# Patient Record
Sex: MALE | Race: ASIAN | NOT HISPANIC OR LATINO
[De-identification: names, ages, dates, MRNs, and addresses within clinical notes are randomized per-mention and may not be internally consistent; named-entity substitution may affect disease eponyms.]

---

## 2023-08-22 PROBLEM — Z87.09 HISTORY OF CHRONIC BRONCHITIS: Status: RESOLVED | Noted: 2023-08-22 | Resolved: 2023-08-22

## 2023-08-22 PROBLEM — Z00.00 ENCOUNTER FOR PREVENTIVE HEALTH EXAMINATION: Status: ACTIVE | Noted: 2023-08-22

## 2023-08-22 PROBLEM — R05.3 CHRONIC COUGH: Status: ACTIVE | Noted: 2023-08-22

## 2023-08-22 RX ORDER — ALBUTEROL SULFATE 2.5 MG/3ML
(2.5 MG/3ML) SOLUTION RESPIRATORY (INHALATION)
Refills: 0 | Status: ACTIVE | COMMUNITY

## 2023-08-22 RX ORDER — FLUTICASONE FUROATE, UMECLIDINIUM BROMIDE AND VILANTEROL TRIFENATATE 100; 62.5; 25 UG/1; UG/1; UG/1
100-62.5-25 POWDER RESPIRATORY (INHALATION)
Refills: 0 | Status: ACTIVE | COMMUNITY

## 2023-08-22 RX ORDER — PANTOPRAZOLE SODIUM 40 MG/10ML
40 INJECTION, POWDER, FOR SOLUTION INTRAVENOUS
Refills: 0 | Status: ACTIVE | COMMUNITY

## 2023-08-22 RX ORDER — PREDNISONE 20 MG/1
20 TABLET ORAL
Refills: 0 | Status: ACTIVE | COMMUNITY

## 2023-08-25 ENCOUNTER — APPOINTMENT (OUTPATIENT)
Dept: THORACIC SURGERY | Facility: CLINIC | Age: 53
End: 2023-08-25
Payer: COMMERCIAL

## 2023-08-25 ENCOUNTER — OUTPATIENT (OUTPATIENT)
Dept: OUTPATIENT SERVICES | Facility: HOSPITAL | Age: 53
LOS: 1 days | End: 2023-08-25
Payer: COMMERCIAL

## 2023-08-25 DIAGNOSIS — Z87.891 PERSONAL HISTORY OF NICOTINE DEPENDENCE: ICD-10-CM

## 2023-08-25 DIAGNOSIS — R05.3 CHRONIC COUGH: ICD-10-CM

## 2023-08-25 DIAGNOSIS — Z01.818 ENCOUNTER FOR OTHER PREPROCEDURAL EXAMINATION: ICD-10-CM

## 2023-08-25 DIAGNOSIS — J84.9 INTERSTITIAL PULMONARY DISEASE, UNSPECIFIED: ICD-10-CM

## 2023-08-25 DIAGNOSIS — Z87.09 PERSONAL HISTORY OF OTHER DISEASES OF THE RESPIRATORY SYSTEM: ICD-10-CM

## 2023-08-25 LAB
ALBUMIN SERPL ELPH-MCNC: 4.6 G/DL — SIGNIFICANT CHANGE UP (ref 3.3–5)
ALP SERPL-CCNC: 90 U/L — SIGNIFICANT CHANGE UP (ref 40–120)
ALT FLD-CCNC: 23 U/L — SIGNIFICANT CHANGE UP (ref 10–45)
ANION GAP SERPL CALC-SCNC: 10 MMOL/L — SIGNIFICANT CHANGE UP (ref 5–17)
APPEARANCE UR: CLEAR — SIGNIFICANT CHANGE UP
APTT BLD: 30 SEC — SIGNIFICANT CHANGE UP (ref 24.5–35.6)
AST SERPL-CCNC: 19 U/L — SIGNIFICANT CHANGE UP (ref 10–40)
BASOPHILS # BLD AUTO: 0.04 K/UL — SIGNIFICANT CHANGE UP (ref 0–0.2)
BASOPHILS NFR BLD AUTO: 0.4 % — SIGNIFICANT CHANGE UP (ref 0–2)
BILIRUB SERPL-MCNC: 1.1 MG/DL — SIGNIFICANT CHANGE UP (ref 0.2–1.2)
BILIRUB UR-MCNC: NEGATIVE — SIGNIFICANT CHANGE UP
BLD GP AB SCN SERPL QL: NEGATIVE — SIGNIFICANT CHANGE UP
BLD GP AB SCN SERPL QL: NEGATIVE — SIGNIFICANT CHANGE UP
BUN SERPL-MCNC: 20 MG/DL — SIGNIFICANT CHANGE UP (ref 7–23)
CALCIUM SERPL-MCNC: 9.6 MG/DL — SIGNIFICANT CHANGE UP (ref 8.4–10.5)
CHLORIDE SERPL-SCNC: 102 MMOL/L — SIGNIFICANT CHANGE UP (ref 96–108)
CO2 SERPL-SCNC: 30 MMOL/L — SIGNIFICANT CHANGE UP (ref 22–31)
COLOR SPEC: YELLOW — SIGNIFICANT CHANGE UP
CREAT SERPL-MCNC: 0.89 MG/DL — SIGNIFICANT CHANGE UP (ref 0.5–1.3)
DIFF PNL FLD: NEGATIVE — SIGNIFICANT CHANGE UP
EGFR: 103 ML/MIN/1.73M2 — SIGNIFICANT CHANGE UP
EOSINOPHIL # BLD AUTO: 0.07 K/UL — SIGNIFICANT CHANGE UP (ref 0–0.5)
EOSINOPHIL NFR BLD AUTO: 0.8 % — SIGNIFICANT CHANGE UP (ref 0–6)
GLUCOSE SERPL-MCNC: 75 MG/DL — SIGNIFICANT CHANGE UP (ref 70–99)
GLUCOSE UR QL: NEGATIVE — SIGNIFICANT CHANGE UP
HCT VFR BLD CALC: 50.9 % — HIGH (ref 39–50)
HGB BLD-MCNC: 16.7 G/DL — SIGNIFICANT CHANGE UP (ref 13–17)
IMM GRANULOCYTES NFR BLD AUTO: 1.2 % — HIGH (ref 0–0.9)
INR BLD: 0.82 — LOW (ref 0.85–1.18)
KETONES UR-MCNC: NEGATIVE — SIGNIFICANT CHANGE UP
LEUKOCYTE ESTERASE UR-ACNC: NEGATIVE — SIGNIFICANT CHANGE UP
LYMPHOCYTES # BLD AUTO: 2.51 K/UL — SIGNIFICANT CHANGE UP (ref 1–3.3)
LYMPHOCYTES # BLD AUTO: 27.9 % — SIGNIFICANT CHANGE UP (ref 13–44)
MCHC RBC-ENTMCNC: 31.3 PG — SIGNIFICANT CHANGE UP (ref 27–34)
MCHC RBC-ENTMCNC: 32.8 GM/DL — SIGNIFICANT CHANGE UP (ref 32–36)
MCV RBC AUTO: 95.5 FL — SIGNIFICANT CHANGE UP (ref 80–100)
MONOCYTES # BLD AUTO: 0.61 K/UL — SIGNIFICANT CHANGE UP (ref 0–0.9)
MONOCYTES NFR BLD AUTO: 6.8 % — SIGNIFICANT CHANGE UP (ref 2–14)
NEUTROPHILS # BLD AUTO: 5.66 K/UL — SIGNIFICANT CHANGE UP (ref 1.8–7.4)
NEUTROPHILS NFR BLD AUTO: 62.9 % — SIGNIFICANT CHANGE UP (ref 43–77)
NITRITE UR-MCNC: NEGATIVE — SIGNIFICANT CHANGE UP
NRBC # BLD: 0 /100 WBCS — SIGNIFICANT CHANGE UP (ref 0–0)
PH UR: 6 — SIGNIFICANT CHANGE UP (ref 5–8)
PLATELET # BLD AUTO: 200 K/UL — SIGNIFICANT CHANGE UP (ref 150–400)
POTASSIUM SERPL-MCNC: 4.5 MMOL/L — SIGNIFICANT CHANGE UP (ref 3.5–5.3)
POTASSIUM SERPL-SCNC: 4.5 MMOL/L — SIGNIFICANT CHANGE UP (ref 3.5–5.3)
PROT SERPL-MCNC: 7.9 G/DL — SIGNIFICANT CHANGE UP (ref 6–8.3)
PROT UR-MCNC: NEGATIVE MG/DL — SIGNIFICANT CHANGE UP
PROTHROM AB SERPL-ACNC: 9.4 SEC — LOW (ref 9.5–13)
RBC # BLD: 5.33 M/UL — SIGNIFICANT CHANGE UP (ref 4.2–5.8)
RBC # FLD: 13 % — SIGNIFICANT CHANGE UP (ref 10.3–14.5)
RH IG SCN BLD-IMP: POSITIVE — SIGNIFICANT CHANGE UP
RH IG SCN BLD-IMP: POSITIVE — SIGNIFICANT CHANGE UP
SODIUM SERPL-SCNC: 142 MMOL/L — SIGNIFICANT CHANGE UP (ref 135–145)
SP GR SPEC: 1.02 — SIGNIFICANT CHANGE UP (ref 1–1.03)
UROBILINOGEN FLD QL: 0.2 E.U./DL — SIGNIFICANT CHANGE UP
WBC # BLD: 9 K/UL — SIGNIFICANT CHANGE UP (ref 3.8–10.5)
WBC # FLD AUTO: 9 K/UL — SIGNIFICANT CHANGE UP (ref 3.8–10.5)

## 2023-08-25 PROCEDURE — 93010 ELECTROCARDIOGRAM REPORT: CPT

## 2023-08-25 PROCEDURE — 86900 BLOOD TYPING SEROLOGIC ABO: CPT

## 2023-08-25 PROCEDURE — 86850 RBC ANTIBODY SCREEN: CPT

## 2023-08-25 PROCEDURE — 81003 URINALYSIS AUTO W/O SCOPE: CPT

## 2023-08-25 PROCEDURE — 80053 COMPREHEN METABOLIC PANEL: CPT

## 2023-08-25 PROCEDURE — 86901 BLOOD TYPING SEROLOGIC RH(D): CPT

## 2023-08-25 PROCEDURE — 85730 THROMBOPLASTIN TIME PARTIAL: CPT

## 2023-08-25 PROCEDURE — 85610 PROTHROMBIN TIME: CPT

## 2023-08-25 PROCEDURE — 99202 OFFICE O/P NEW SF 15 MIN: CPT

## 2023-08-25 PROCEDURE — 93005 ELECTROCARDIOGRAM TRACING: CPT

## 2023-08-25 PROCEDURE — 85025 COMPLETE CBC W/AUTO DIFF WBC: CPT

## 2023-08-28 PROBLEM — J84.9 ILD (INTERSTITIAL LUNG DISEASE): Status: ACTIVE | Noted: 2023-08-22

## 2023-08-28 PROBLEM — Z87.891 FORMER SMOKER: Status: ACTIVE | Noted: 2023-08-28

## 2023-08-28 NOTE — ASSESSMENT
[FreeTextEntry1] : 52 year old male, former light smoker (smoked 2-3 cigarette/week), no significant medical history,  Patient is referred by Dr. Teo Álvarez for an initial evaluation of ILD to discuss possible lung biopsy. Sputum AFB smear is negativex3, culture negative x 4 weeks.   Patient worked in [employer] for over 10 years, Admits to persistent dry cough and SOB on exertion. There is no chest pain, fever, chills, intentional weight loss and hemoptysis.  I reviewed the recent CT chest with the patient and his family today. There is evidence of interstitial fibrotic changes predominating at the left lung with undetermined etiology. I suggested Left VATs, RA, wedge resection of left upper lobe and left lower lobe for the purpose of diagnosis. The risks, benefits and alternatives of proceeding with lung biopsy, specifically, the risk of bleeding, need for a blood transfusion, conversion to an open procedure, prolonged air leak, dysrhythmia, myocardial infarction, deep venous thrombosis, pulmonary embolus, postoperative pain syndrome, infection was discussed with the patient, who understands and agrees to the above.  He will require medical clearance, PST labs, UA, and EKG prior to surgery. Will schedule surgery for 09/15/23. Will   Plan: 1.PST labs, UA (Done today) 3. Medical clearance  4. Left VATS, robotic assisted, Wedge resection of left upper lobe and left lower lobe on 09/15/23.   Celeste FINNEGAN RN, am scribing for and the presence of Dr. LUIS MICHAUD the following sections: history of present illness, past medical/family/surgical/family/social history, review of systems, vital signs, physical exam, and disposition.  LUIS FINNEGAN, personally performed the services described in the documentation, reviewed the documentation recorded by the scribe in my presence and it accurately and completely records my words and actions.

## 2023-08-28 NOTE — CONSULT LETTER
[Dear  ___] : Dear  [unfilled], [Consult Letter:] : I had the pleasure of evaluating your patient, [unfilled]. [Please see my note below.] : Please see my note below. [Consult Closing:] : Thank you very much for allowing me to participate in the care of this patient.  If you have any questions, please do not hesitate to contact me. [Sincerely,] : Sincerely, [FreeTextEntry3] : Christ Good MD Professor, Cardiovascular & Thoracic Surgery [employer] Director of the [facility]  Director of Thoracic Surgery, [address] [place] [address] Phone: [phone] Fax: [phone]

## 2023-08-28 NOTE — PHYSICAL EXAM
[General Appearance - Alert] : alert [General Appearance - In No Acute Distress] : in no acute distress [Examination Of The Chest] : the chest was normal in appearance [Chest Visual Inspection Thoracic Asymmetry] : no chest asymmetry [Diminished Respiratory Excursion] : normal chest expansion [Abnormal Walk] : normal gait [Nail Clubbing] : no clubbing  or cyanosis of the fingernails [Musculoskeletal - Swelling] : no joint swelling seen [Motor Tone] : muscle strength and tone were normal [Oriented To Time, Place, And Person] : oriented to person, place, and time [Impaired Insight] : insight and judgment were intact [Affect] : the affect was normal

## 2023-08-28 NOTE — HISTORY OF PRESENT ILLNESS
[FreeTextEntry1] : 52 year old male, former light smoker (smoked 2-3 cigarette/week), no significant medical history, who worked in [employer] for over 10 years, with c/o persistent dry cough and SOB on exertion. Patient is referred by Dr. Teo Álvarez for an initial evaluation of ILD to discuss possible lung biopsy. Sputum AFB smear is negativex3, culture negative x 4 weeks.   CT chest completed on 06/03/2023: Atherosclerosis with minimal coronary artery calcification. There is extensive interstitial pulmonary fibrosis and bronchiectasis on the left where there is volume loss and shift of the heart, mediastinum and trachea to the left with elevation of the left hemidiaphragm. There is less severe interstitial pulmonary disease on the right primarily peripherally in the right upper and right lower lobes. There is no evidence for congestive heart failure or pneumonia. There is no pleural effusion or pneumothorax. Thickening of the wall of the stomach and mass cannot be excluded. Endoscopy and or upper GI series is suggested.

## 2023-09-13 VITALS
HEART RATE: 80 BPM | DIASTOLIC BLOOD PRESSURE: 64 MMHG | RESPIRATION RATE: 18 BRPM | TEMPERATURE: 98 F | OXYGEN SATURATION: 94 % | WEIGHT: 126.1 LBS | SYSTOLIC BLOOD PRESSURE: 118 MMHG

## 2023-09-13 NOTE — H&P ADULT - HISTORY OF PRESENT ILLNESS
52 year old Mandarin speaking male, former light smoker (smoked 2-3 cigarette/week), no significant medical history, who worked in [employer] for over 10 years presents with c/o persistent dry cough and SOB on exertion. Patient is referred by Dr. Teo Álvarez. Sputum AFB smear is negativex3, culture negative x 4 weeks. Patient seen by Dr. Christ Good in the outpatient office for surgical consult, completed preop work up and clearance. He now presents for elective surgery.     CT chest completed on 06/03/2023:  Atherosclerosis with minimal coronary artery calcification. There is extensive interstitial pulmonary fibrosis and bronchiectasis on the left where there is volume loss and shift of the heart, mediastinum and trachea to the left with elevation of the left hemidiaphragm. There is less severe interstitial pulmonary disease on the right primarily peripherally in the right upper and right lower lobes. There is no evidence for congestive heart failure or pneumonia. There is no pleural effusion or pneumothorax. Thickening of the wall of the stomach and mass cannot be excluded. Endoscopy and or upper GI series is suggested.

## 2023-09-13 NOTE — H&P ADULT - ASSESSMENT
52 year old male, former light smoker, no significant medical history presents with persistent dry cough and SOB on exertion with ILD of the left lung.  Sputum AFB smear is negativex3, culture negative x 4 weeks.There is no chest pain, fever, chills, intentional weight loss and hemoptysis.  ?  Dr. Good reviewed the recent CT chest with the patient and his family. There is evidence of interstitial fibrotic changes predominating at the left lung with undetermined etiology. Dr. Good  suggested Left VATs, RA, wedge resection of left upper lobe and left lower lobe for the purpose of diagnosis. The risks, benefits and alternatives of proceeding with lung biopsy, specifically, the risk of bleeding, need for a blood transfusion, conversion to an open procedure, prolonged air leak, dysrhythmia, myocardial infarction, deep venous thrombosis, pulmonary embolus, postoperative pain syndrome, infection was discussed with the patient, who understands and agrees to the above.  ?  He will require medical clearance, PST labs, UA, and EKG prior to surgery. Will schedule surgery for 09/15/23. Will  ?  Plan:  1.PST labs  3. Medical clearance  4. Left VATS, robotic assisted, Wedge resection of left upper lobe and left lower lobe on 09/15/23.  ?

## 2023-09-14 ENCOUNTER — TRANSCRIPTION ENCOUNTER (OUTPATIENT)
Age: 53
End: 2023-09-14

## 2023-09-14 RX ORDER — INFLUENZA VIRUS VACCINE 15; 15; 15; 15 UG/.5ML; UG/.5ML; UG/.5ML; UG/.5ML
0.5 SUSPENSION INTRAMUSCULAR ONCE
Refills: 0 | Status: DISCONTINUED | OUTPATIENT
Start: 2023-09-15 | End: 2023-09-16

## 2023-09-14 NOTE — PATIENT PROFILE ADULT - FALL HARM RISK - UNIVERSAL INTERVENTIONS
Bed in lowest position, wheels locked, appropriate side rails in place/Call bell, personal items and telephone in reach/Instruct patient to call for assistance before getting out of bed or chair/Non-slip footwear when patient is out of bed/[place] to call system/Physically safe environment - no spills, clutter or unnecessary equipment/Purposeful Proactive Rounding/Room/bathroom lighting operational, light cord in reach

## 2023-09-15 ENCOUNTER — INPATIENT (INPATIENT)
Facility: HOSPITAL | Age: 53
LOS: 0 days | Discharge: HOME HEALTH | DRG: 165 | End: 2023-09-16
Attending: THORACIC SURGERY (CARDIOTHORACIC VASCULAR SURGERY) | Admitting: THORACIC SURGERY (CARDIOTHORACIC VASCULAR SURGERY)
Payer: COMMERCIAL

## 2023-09-15 ENCOUNTER — APPOINTMENT (OUTPATIENT)
Dept: THORACIC SURGERY | Facility: HOSPITAL | Age: 53
End: 2023-09-15

## 2023-09-15 ENCOUNTER — RESULT REVIEW (OUTPATIENT)
Age: 53
End: 2023-09-15

## 2023-09-15 LAB
ANION GAP SERPL CALC-SCNC: 7 MMOL/L — SIGNIFICANT CHANGE UP (ref 5–17)
APTT BLD: 36 SEC — HIGH (ref 24.5–35.6)
BASOPHILS # BLD AUTO: 0.03 K/UL — SIGNIFICANT CHANGE UP (ref 0–0.2)
BASOPHILS NFR BLD AUTO: 0.3 % — SIGNIFICANT CHANGE UP (ref 0–2)
BLD GP AB SCN SERPL QL: NEGATIVE — SIGNIFICANT CHANGE UP
BUN SERPL-MCNC: 17 MG/DL — SIGNIFICANT CHANGE UP (ref 7–23)
CALCIUM SERPL-MCNC: 9.3 MG/DL — SIGNIFICANT CHANGE UP (ref 8.4–10.5)
CHLORIDE SERPL-SCNC: 104 MMOL/L — SIGNIFICANT CHANGE UP (ref 96–108)
CO2 SERPL-SCNC: 27 MMOL/L — SIGNIFICANT CHANGE UP (ref 22–31)
CREAT SERPL-MCNC: 0.9 MG/DL — SIGNIFICANT CHANGE UP (ref 0.5–1.3)
EGFR: 103 ML/MIN/1.73M2 — SIGNIFICANT CHANGE UP
EOSINOPHIL # BLD AUTO: 0.02 K/UL — SIGNIFICANT CHANGE UP (ref 0–0.5)
EOSINOPHIL NFR BLD AUTO: 0.2 % — SIGNIFICANT CHANGE UP (ref 0–6)
GLUCOSE SERPL-MCNC: 115 MG/DL — HIGH (ref 70–99)
GRAM STN FLD: SIGNIFICANT CHANGE UP
GRAM STN FLD: SIGNIFICANT CHANGE UP
HCT VFR BLD CALC: 46 % — SIGNIFICANT CHANGE UP (ref 39–50)
HGB BLD-MCNC: 15.5 G/DL — SIGNIFICANT CHANGE UP (ref 13–17)
IMM GRANULOCYTES NFR BLD AUTO: 0.9 % — SIGNIFICANT CHANGE UP (ref 0–0.9)
INR BLD: 0.88 — SIGNIFICANT CHANGE UP (ref 0.85–1.18)
LYMPHOCYTES # BLD AUTO: 0.97 K/UL — LOW (ref 1–3.3)
LYMPHOCYTES # BLD AUTO: 9.1 % — LOW (ref 13–44)
MCHC RBC-ENTMCNC: 32 PG — SIGNIFICANT CHANGE UP (ref 27–34)
MCHC RBC-ENTMCNC: 33.7 GM/DL — SIGNIFICANT CHANGE UP (ref 32–36)
MCV RBC AUTO: 94.8 FL — SIGNIFICANT CHANGE UP (ref 80–100)
MONOCYTES # BLD AUTO: 0.19 K/UL — SIGNIFICANT CHANGE UP (ref 0–0.9)
MONOCYTES NFR BLD AUTO: 1.8 % — LOW (ref 2–14)
NEUTROPHILS # BLD AUTO: 9.38 K/UL — HIGH (ref 1.8–7.4)
NEUTROPHILS NFR BLD AUTO: 87.7 % — HIGH (ref 43–77)
NRBC # BLD: 0 /100 WBCS — SIGNIFICANT CHANGE UP (ref 0–0)
PLATELET # BLD AUTO: 171 K/UL — SIGNIFICANT CHANGE UP (ref 150–400)
POTASSIUM SERPL-MCNC: 4.4 MMOL/L — SIGNIFICANT CHANGE UP (ref 3.5–5.3)
POTASSIUM SERPL-SCNC: 4.4 MMOL/L — SIGNIFICANT CHANGE UP (ref 3.5–5.3)
PROTHROM AB SERPL-ACNC: 10.1 SEC — SIGNIFICANT CHANGE UP (ref 9.5–13)
RBC # BLD: 4.85 M/UL — SIGNIFICANT CHANGE UP (ref 4.2–5.8)
RBC # FLD: 12.8 % — SIGNIFICANT CHANGE UP (ref 10.3–14.5)
RH IG SCN BLD-IMP: POSITIVE — SIGNIFICANT CHANGE UP
SODIUM SERPL-SCNC: 138 MMOL/L — SIGNIFICANT CHANGE UP (ref 135–145)
SPECIMEN SOURCE: SIGNIFICANT CHANGE UP
SPECIMEN SOURCE: SIGNIFICANT CHANGE UP
WBC # BLD: 10.69 K/UL — HIGH (ref 3.8–10.5)
WBC # FLD AUTO: 10.69 K/UL — HIGH (ref 3.8–10.5)

## 2023-09-15 PROCEDURE — 32666 THORACOSCOPY W/WEDGE RESECT: CPT | Mod: LT

## 2023-09-15 PROCEDURE — 71045 X-RAY EXAM CHEST 1 VIEW: CPT | Mod: 26

## 2023-09-15 PROCEDURE — 99222 1ST HOSP IP/OBS MODERATE 55: CPT

## 2023-09-15 PROCEDURE — 32667 THORACOSCOPY W/W RESECT ADDL: CPT | Mod: LT

## 2023-09-15 PROCEDURE — 88307 TISSUE EXAM BY PATHOLOGIST: CPT | Mod: 26

## 2023-09-15 PROCEDURE — 32667 THORACOSCOPY W/W RESECT ADDL: CPT | Mod: AS,LT

## 2023-09-15 PROCEDURE — S2900 ROBOTIC SURGICAL SYSTEM: CPT | Mod: NC

## 2023-09-15 PROCEDURE — 32666 THORACOSCOPY W/WEDGE RESECT: CPT | Mod: AS,LT

## 2023-09-15 DEVICE — CHEST DRAIN THORACIC PVC 28FR STRAIGHT: Type: IMPLANTABLE DEVICE | Site: LEFT | Status: FUNCTIONAL

## 2023-09-15 DEVICE — SURGICEL 4 X 8": Type: IMPLANTABLE DEVICE | Site: LEFT | Status: FUNCTIONAL

## 2023-09-15 DEVICE — STAPLER COVIDIEN TRI-STAPLE 45MM PURPLE INTELLIGENT RELOAD: Type: IMPLANTABLE DEVICE | Site: LEFT | Status: FUNCTIONAL

## 2023-09-15 DEVICE — STAPLER COVIDIEN TRI-STAPLE 60MM PURPLE INTELLIGENT RELOAD: Type: IMPLANTABLE DEVICE | Site: LEFT | Status: FUNCTIONAL

## 2023-09-15 RX ORDER — ACETAMINOPHEN 500 MG
975 TABLET ORAL ONCE
Refills: 0 | Status: COMPLETED | OUTPATIENT
Start: 2023-09-15 | End: 2023-09-15

## 2023-09-15 RX ORDER — METOCLOPRAMIDE HCL 10 MG
10 TABLET ORAL ONCE
Refills: 0 | Status: COMPLETED | OUTPATIENT
Start: 2023-09-15 | End: 2023-09-15

## 2023-09-15 RX ORDER — HEPARIN SODIUM 5000 [USP'U]/ML
5000 INJECTION INTRAVENOUS; SUBCUTANEOUS ONCE
Refills: 0 | Status: COMPLETED | OUTPATIENT
Start: 2023-09-15 | End: 2023-09-15

## 2023-09-15 RX ORDER — SODIUM CHLORIDE 9 MG/ML
1000 INJECTION, SOLUTION INTRAVENOUS
Refills: 0 | Status: DISCONTINUED | OUTPATIENT
Start: 2023-09-15 | End: 2023-09-16

## 2023-09-15 RX ORDER — POLYETHYLENE GLYCOL 3350 17 G/17G
17 POWDER, FOR SOLUTION ORAL DAILY
Refills: 0 | Status: DISCONTINUED | OUTPATIENT
Start: 2023-09-15 | End: 2023-09-16

## 2023-09-15 RX ORDER — ACETAMINOPHEN 500 MG
1000 TABLET ORAL ONCE
Refills: 0 | Status: COMPLETED | OUTPATIENT
Start: 2023-09-15 | End: 2023-09-15

## 2023-09-15 RX ORDER — FAMOTIDINE 10 MG/ML
20 INJECTION INTRAVENOUS DAILY
Refills: 0 | Status: DISCONTINUED | OUTPATIENT
Start: 2023-09-15 | End: 2023-09-15

## 2023-09-15 RX ORDER — IPRATROPIUM/ALBUTEROL SULFATE 18-103MCG
3 AEROSOL WITH ADAPTER (GRAM) INHALATION EVERY 6 HOURS
Refills: 0 | Status: DISCONTINUED | OUTPATIENT
Start: 2023-09-15 | End: 2023-09-16

## 2023-09-15 RX ORDER — CEFAZOLIN SODIUM 1 G
2000 VIAL (EA) INJECTION EVERY 8 HOURS
Refills: 0 | Status: COMPLETED | OUTPATIENT
Start: 2023-09-15 | End: 2023-09-16

## 2023-09-15 RX ORDER — LIDOCAINE 4 G/100G
1 CREAM TOPICAL DAILY
Refills: 0 | Status: DISCONTINUED | OUTPATIENT
Start: 2023-09-15 | End: 2023-09-16

## 2023-09-15 RX ORDER — KETOROLAC TROMETHAMINE 30 MG/ML
15 SYRINGE (ML) INJECTION EVERY 4 HOURS
Refills: 0 | Status: DISCONTINUED | OUTPATIENT
Start: 2023-09-15 | End: 2023-09-16

## 2023-09-15 RX ORDER — ACETAMINOPHEN 500 MG
650 TABLET ORAL EVERY 6 HOURS
Refills: 0 | Status: DISCONTINUED | OUTPATIENT
Start: 2023-09-16 | End: 2023-09-16

## 2023-09-15 RX ORDER — PANTOPRAZOLE SODIUM 20 MG/1
40 TABLET, DELAYED RELEASE ORAL
Refills: 0 | Status: DISCONTINUED | OUTPATIENT
Start: 2023-09-15 | End: 2023-09-16

## 2023-09-15 RX ORDER — GABAPENTIN 400 MG/1
300 CAPSULE ORAL ONCE
Refills: 0 | Status: COMPLETED | OUTPATIENT
Start: 2023-09-15 | End: 2023-09-15

## 2023-09-15 RX ORDER — HEPARIN SODIUM 5000 [USP'U]/ML
5000 INJECTION INTRAVENOUS; SUBCUTANEOUS EVERY 8 HOURS
Refills: 0 | Status: DISCONTINUED | OUTPATIENT
Start: 2023-09-15 | End: 2023-09-16

## 2023-09-15 RX ADMIN — Medication 20 MILLIGRAM(S): at 17:48

## 2023-09-15 RX ADMIN — Medication 100 MILLIGRAM(S): at 16:07

## 2023-09-15 RX ADMIN — HEPARIN SODIUM 5000 UNIT(S): 5000 INJECTION INTRAVENOUS; SUBCUTANEOUS at 22:33

## 2023-09-15 RX ADMIN — Medication 10 MILLIGRAM(S): at 11:23

## 2023-09-15 RX ADMIN — Medication 975 MILLIGRAM(S): at 07:09

## 2023-09-15 RX ADMIN — LIDOCAINE 1 PATCH: 4 CREAM TOPICAL at 11:03

## 2023-09-15 RX ADMIN — HEPARIN SODIUM 5000 UNIT(S): 5000 INJECTION INTRAVENOUS; SUBCUTANEOUS at 07:08

## 2023-09-15 RX ADMIN — SODIUM CHLORIDE 60 MILLILITER(S): 9 INJECTION, SOLUTION INTRAVENOUS at 11:23

## 2023-09-15 RX ADMIN — LIDOCAINE 1 PATCH: 4 CREAM TOPICAL at 22:33

## 2023-09-15 RX ADMIN — Medication 1000 MILLIGRAM(S): at 16:19

## 2023-09-15 RX ADMIN — LIDOCAINE 1 PATCH: 4 CREAM TOPICAL at 21:16

## 2023-09-15 RX ADMIN — HEPARIN SODIUM 5000 UNIT(S): 5000 INJECTION INTRAVENOUS; SUBCUTANEOUS at 13:40

## 2023-09-15 RX ADMIN — GABAPENTIN 300 MILLIGRAM(S): 400 CAPSULE ORAL at 07:09

## 2023-09-15 RX ADMIN — PANTOPRAZOLE SODIUM 40 MILLIGRAM(S): 20 TABLET, DELAYED RELEASE ORAL at 17:48

## 2023-09-15 RX ADMIN — Medication 400 MILLIGRAM(S): at 14:50

## 2023-09-15 NOTE — CONSULT NOTE ADULT - ASSESSMENT
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 Patient is referred by Pulm Dr. Teo Álvarez. CT chest (06/03/2023) revealed extensive interstitial pulmonary fibrosis and bronchiectasis on the left where there is volume loss and shift of the heart, mediastinum and trachea to the left with elevation of the left hemidiaphragm. There is less severe interstitial pulmonary disease on the right primarily peripherally in the right upper and right lower lobes. There is no evidence for congestive heart failure or pneumonia. There is no pleural effusion or pneumothorax. Thickening of the wall of the stomach and mass cannot be excluded. Pt underwent   L VATS, YARITZA & LLL wedge resection ( 9/15/23)  POD#0     - 9LA for HD monitoring   - active care per CTS  - L CT x1 in place   - f/u CXR in am   - postop iv abx: ceFAZolin   IVPB 2000 milliGRAM(s) IV Intermittent every 8 hours  -albuterol/ipratropium for Nebulization 3 milliLiter(s) Nebulizer every 6 hours PRN  - c/w home dose predniSONE   Tablet 20 milliGRAM(s) Oral daily  - pain control:   lidocaine   4% Patch 1 Patch Transdermal daily  ketorolac   Injectable 15 milliGRAM(s) IV Push every 4 hours PRN  - bowel regimen:   polyethylene glycol 3350 17 Gram(s) Oral daily  - GI ppx: pantoprazole    Tablet 40 milliGRAM(s) Oral before breakfast  - DVT ppx: heparin   Injectable 5000 Unit(s) SubCutaneous every 8 hours  - IC use at bedside  - OOBTC w. assistance as tolerated      spoke w. pt and daughter at bedside.      Services continues to follow up with you, thank you for the consult.

## 2023-09-15 NOTE — BRIEF OPERATIVE NOTE - NSICDXBRIEFPROCEDURE_GEN_ALL_CORE_FT
PROCEDURES:  Robot-assisted thoracoscopic wedge resection of lung 15-Sep-2023 10:13:23 left upper lobe and left lower lobe wedge resection Alee Mclean

## 2023-09-15 NOTE — CONSULT NOTE ADULT - SUBJECTIVE AND OBJECTIVE BOX
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 Patient is referred by Pulm Dr. Teo Álvarez. Sputum AFB smear is negativex3, culture negative x 4 weeks. Patient seen by Dr. Christ Good in the outpatient office for surgical consult, completed preop work up and clearance. He now presents for elective surgery.     CT chest completed on 06/03/2023:  Atherosclerosis with minimal coronary artery calcification. There is extensive interstitial pulmonary fibrosis and bronchiectasis on the left where there is volume loss and shift of the heart, mediastinum and trachea to the left with elevation of the left hemidiaphragm. There is less severe interstitial pulmonary disease on the right primarily peripherally in the right upper and right lower lobes. There is no evidence for congestive heart failure or pneumonia. There is no pleural effusion or pneumothorax. Thickening of the wall of the stomach and mass cannot be excluded. Endoscopy and or upper GI series is suggested.    Pt underwent L VATS, YARITZA & LLL wedge resection ( 9/15/23)  POD#0      (13 Sep 2023 16:59)      PAST MEDICAL & SURGICAL HISTORY:  Chronic GERD      Chronic bronchitis, obstructive      Asthma      No significant past surgical history        Home Medications:  albuterol 2.5 mg/3 mL (0.083%) inhalation solution: 3 by nebulizer (15 Sep 2023 06:58)  omeprazole 40 mg oral delayed release capsule: 1 orally once a day (15 Sep 2023 06:58)  predniSONE 20 mg oral tablet: 1 orally once a day (15 Sep 2023 06:58)  Trelegy Ellipta 200 mcg-62.5 mcg-25 mcg/inh inhalation powder: 1 inhaled once a day (15 Sep 2023 06:58)    Allergies    No Known Allergies    Intolerances      FAMILY HISTORY:  No pertinent family history in first degree relatives      Social History:  former smoker (13 Sep 2023 16:59)      REVIEW OF SYSTEMS:  CONSTITUTIONAL: No fever, weight loss  EYES: No eye pain, or discharge  ENMT:  No tinnitus, vertigo  NECK: No pain or stiffness  RESPIRATORY: No cough, No dyspnea  CARDIOVASCULAR: No chest pain, or leg swelling  GASTROINTESTINAL: No abdominal pain. No diarrhea ;No melena or hematochezia.  GENITOURINARY: No dysuria, frequency, or hematuria  NEUROLOGICAL: No numbness, or tremors  SKIN: No itching, burning, rashes, or lesions   ENDOCRINE: No heat or cold intolerance;  MUSCULOSKELETAL: No joint pain or swelling;   PSYCHIATRIC: No mood swings, or difficulty sleeping  HEME/LYMPH: No easy bruising, or bleeding gums  ALLERGY AND IMMUNOLOGIC: No hives or eczema    Diet, Regular (09-15-23 @ 16:50) [Active]      CURRENT MEDICATIONS:   albuterol/ipratropium for Nebulization 3 milliLiter(s) Nebulizer every 6 hours PRN  ceFAZolin   IVPB 2000 milliGRAM(s) IV Intermittent every 8 hours  heparin   Injectable 5000 Unit(s) SubCutaneous every 8 hours  influenza   Vaccine 0.5 milliLiter(s) IntraMuscular once  ketorolac   Injectable 15 milliGRAM(s) IV Push every 4 hours PRN  lactated ringers. 1000 milliLiter(s) IV Continuous <Continuous>  lidocaine   4% Patch 1 Patch Transdermal daily  pantoprazole    Tablet 40 milliGRAM(s) Oral before breakfast  polyethylene glycol 3350 17 Gram(s) Oral daily  predniSONE   Tablet 20 milliGRAM(s) Oral daily      VITAL SIGNS, INS/OUTS (last 24 hours):  Vital Signs Last 24 Hrs  T(C): 37.2 (15 Sep 2023 17:08), Max: 37.2 (15 Sep 2023 17:08)  T(F): 98.9 (15 Sep 2023 17:08), Max: 98.9 (15 Sep 2023 17:08)  HR: 66 (15 Sep 2023 17:09) (60 - 81)  BP: 104/64 (15 Sep 2023 17:09) (104/64 - 131/83)  BP(mean): 79 (15 Sep 2023 17:09) (79 - 102)  RR: 17 (15 Sep 2023 17:09) (12 - 26)  SpO2: 99% (15 Sep 2023 17:09) (95% - 100%)    Parameters below as of 15 Sep 2023 14:30  Patient On (Oxygen Delivery Method): nasal cannula w/ humidification  O2 Flow (L/min): 2    I&O's Summary    15 Sep 2023 07:01  -  15 Sep 2023 20:58  --------------------------------------------------------  IN: 1755 mL / OUT: 75 mL / NET: 1680 mL        PHYSICAL EXAM:  Gen: Reclining in bed at time of exam, appears stated age  HEENT: NCAT, MMM, clear OP  Neck: supple, trachea at midline  CV: RRR, +S1/S2  Pulm: adequate respiratory effort, no increase in work of breathing  Abd: soft, NTND  Skin: warm and dry,  Ext: WWP, no LE edema  Neuro: AOx3, no gross focal neurological deficits  Psych: affect and behavior appropriate, pleasant at time of interview    BASIC LABS:                        15.5   10.69 )-----------( 171      ( 15 Sep 2023 10:49 )             46.0     09-15    138  |  104  |  17  ----------------------------<  115<H>  4.4   |  27  |  0.90    Ca    9.3      15 Sep 2023 10:49      PT/INR - ( 15 Sep 2023 10:49 )   PT: 10.1 sec;   INR: 0.88          PTT - ( 15 Sep 2023 10:49 )  PTT:36.0 sec  Urinalysis Basic - ( 15 Sep 2023 10:49 )    Color: x / Appearance: x / SG: x / pH: x  Gluc: 115 mg/dL / Ketone: x  / Bili: x / Urobili: x   Blood: x / Protein: x / Nitrite: x   Leuk Esterase: x / RBC: x / WBC x   Sq Epi: x / Non Sq Epi: x / Bacteria: x      CAPILLARY BLOOD GLUCOSE          OTHER LABS:        MICRODATA:    Culture - Tissue with Gram Stain (collected 15 Sep 2023 09:53)  Source: .Tissue (2) Left Upper Lobe Wedge  Gram Stain (15 Sep 2023 16:35):    No organisms seen    No WBC's seen.    Culture - Tissue with Gram Stain (collected 15 Sep 2023 09:53)  Source: .Tissue (1) Left Lower Lung Wedge  Gram Stain (15 Sep 2023 16:36):    No organisms seen    No WBC's seen.        IMAGING:    EKG:    #Diet - Diet, Regular (09-15-23 @ 16:50) [Active]        #DVT PPx -

## 2023-09-16 ENCOUNTER — TRANSCRIPTION ENCOUNTER (OUTPATIENT)
Age: 53
End: 2023-09-16

## 2023-09-16 VITALS
DIASTOLIC BLOOD PRESSURE: 65 MMHG | SYSTOLIC BLOOD PRESSURE: 104 MMHG | OXYGEN SATURATION: 96 % | HEART RATE: 80 BPM | RESPIRATION RATE: 18 BRPM

## 2023-09-16 LAB
ANION GAP SERPL CALC-SCNC: 8 MMOL/L — SIGNIFICANT CHANGE UP (ref 5–17)
BASOPHILS # BLD AUTO: 0.01 K/UL — SIGNIFICANT CHANGE UP (ref 0–0.2)
BASOPHILS NFR BLD AUTO: 0.1 % — SIGNIFICANT CHANGE UP (ref 0–2)
BUN SERPL-MCNC: 12 MG/DL — SIGNIFICANT CHANGE UP (ref 7–23)
CALCIUM SERPL-MCNC: 9.8 MG/DL — SIGNIFICANT CHANGE UP (ref 8.4–10.5)
CHLORIDE SERPL-SCNC: 102 MMOL/L — SIGNIFICANT CHANGE UP (ref 96–108)
CO2 SERPL-SCNC: 30 MMOL/L — SIGNIFICANT CHANGE UP (ref 22–31)
CREAT SERPL-MCNC: 0.82 MG/DL — SIGNIFICANT CHANGE UP (ref 0.5–1.3)
EGFR: 106 ML/MIN/1.73M2 — SIGNIFICANT CHANGE UP
EOSINOPHIL # BLD AUTO: 0 K/UL — SIGNIFICANT CHANGE UP (ref 0–0.5)
EOSINOPHIL NFR BLD AUTO: 0 % — SIGNIFICANT CHANGE UP (ref 0–6)
GLUCOSE SERPL-MCNC: 136 MG/DL — HIGH (ref 70–99)
HCT VFR BLD CALC: 47.1 % — SIGNIFICANT CHANGE UP (ref 39–50)
HGB BLD-MCNC: 15.6 G/DL — SIGNIFICANT CHANGE UP (ref 13–17)
IMM GRANULOCYTES NFR BLD AUTO: 0.7 % — SIGNIFICANT CHANGE UP (ref 0–0.9)
LYMPHOCYTES # BLD AUTO: 0.76 K/UL — LOW (ref 1–3.3)
LYMPHOCYTES # BLD AUTO: 5.6 % — LOW (ref 13–44)
MCHC RBC-ENTMCNC: 31.4 PG — SIGNIFICANT CHANGE UP (ref 27–34)
MCHC RBC-ENTMCNC: 33.1 GM/DL — SIGNIFICANT CHANGE UP (ref 32–36)
MCV RBC AUTO: 94.8 FL — SIGNIFICANT CHANGE UP (ref 80–100)
MONOCYTES # BLD AUTO: 0.82 K/UL — SIGNIFICANT CHANGE UP (ref 0–0.9)
MONOCYTES NFR BLD AUTO: 6.1 % — SIGNIFICANT CHANGE UP (ref 2–14)
NEUTROPHILS # BLD AUTO: 11.85 K/UL — HIGH (ref 1.8–7.4)
NEUTROPHILS NFR BLD AUTO: 87.5 % — HIGH (ref 43–77)
NIGHT BLUE STAIN TISS: SIGNIFICANT CHANGE UP
NRBC # BLD: 0 /100 WBCS — SIGNIFICANT CHANGE UP (ref 0–0)
PLATELET # BLD AUTO: 189 K/UL — SIGNIFICANT CHANGE UP (ref 150–400)
POTASSIUM SERPL-MCNC: 4.9 MMOL/L — SIGNIFICANT CHANGE UP (ref 3.5–5.3)
POTASSIUM SERPL-SCNC: 4.9 MMOL/L — SIGNIFICANT CHANGE UP (ref 3.5–5.3)
RBC # BLD: 4.97 M/UL — SIGNIFICANT CHANGE UP (ref 4.2–5.8)
RBC # FLD: 12.9 % — SIGNIFICANT CHANGE UP (ref 10.3–14.5)
SODIUM SERPL-SCNC: 140 MMOL/L — SIGNIFICANT CHANGE UP (ref 135–145)
SPECIMEN SOURCE: SIGNIFICANT CHANGE UP
WBC # BLD: 13.54 K/UL — HIGH (ref 3.8–10.5)
WBC # FLD AUTO: 13.54 K/UL — HIGH (ref 3.8–10.5)

## 2023-09-16 PROCEDURE — 71045 X-RAY EXAM CHEST 1 VIEW: CPT | Mod: 26,76

## 2023-09-16 PROCEDURE — 87116 MYCOBACTERIA CULTURE: CPT

## 2023-09-16 PROCEDURE — 80048 BASIC METABOLIC PNL TOTAL CA: CPT

## 2023-09-16 PROCEDURE — 86900 BLOOD TYPING SEROLOGIC ABO: CPT

## 2023-09-16 PROCEDURE — 85730 THROMBOPLASTIN TIME PARTIAL: CPT

## 2023-09-16 PROCEDURE — C1889: CPT

## 2023-09-16 PROCEDURE — 87015 SPECIMEN INFECT AGNT CONCNTJ: CPT

## 2023-09-16 PROCEDURE — 71045 X-RAY EXAM CHEST 1 VIEW: CPT

## 2023-09-16 PROCEDURE — 86850 RBC ANTIBODY SCREEN: CPT

## 2023-09-16 PROCEDURE — 36415 COLL VENOUS BLD VENIPUNCTURE: CPT

## 2023-09-16 PROCEDURE — 85025 COMPLETE CBC W/AUTO DIFF WBC: CPT

## 2023-09-16 PROCEDURE — 87102 FUNGUS ISOLATION CULTURE: CPT

## 2023-09-16 PROCEDURE — C9399: CPT

## 2023-09-16 PROCEDURE — 99232 SBSQ HOSP IP/OBS MODERATE 35: CPT

## 2023-09-16 PROCEDURE — 87206 SMEAR FLUORESCENT/ACID STAI: CPT

## 2023-09-16 PROCEDURE — 86901 BLOOD TYPING SEROLOGIC RH(D): CPT

## 2023-09-16 PROCEDURE — S2900: CPT

## 2023-09-16 PROCEDURE — 88307 TISSUE EXAM BY PATHOLOGIST: CPT

## 2023-09-16 PROCEDURE — 85610 PROTHROMBIN TIME: CPT

## 2023-09-16 PROCEDURE — 87070 CULTURE OTHR SPECIMN AEROBIC: CPT

## 2023-09-16 PROCEDURE — 87075 CULTR BACTERIA EXCEPT BLOOD: CPT

## 2023-09-16 RX ORDER — METOCLOPRAMIDE HCL 10 MG
10 TABLET ORAL ONCE
Refills: 0 | Status: COMPLETED | OUTPATIENT
Start: 2023-09-16 | End: 2023-09-16

## 2023-09-16 RX ORDER — ACETAMINOPHEN 500 MG
2 TABLET ORAL
Qty: 240 | Refills: 0
Start: 2023-09-16 | End: 2023-10-15

## 2023-09-16 RX ORDER — IBUPROFEN 200 MG
1 TABLET ORAL
Qty: 120 | Refills: 0
Start: 2023-09-16 | End: 2023-10-15

## 2023-09-16 RX ORDER — METOCLOPRAMIDE HCL 10 MG
10 TABLET ORAL ONCE
Refills: 0 | Status: DISCONTINUED | OUTPATIENT
Start: 2023-09-16 | End: 2023-09-16

## 2023-09-16 RX ORDER — SENNA PLUS 8.6 MG/1
2 TABLET ORAL DAILY
Refills: 0 | Status: DISCONTINUED | OUTPATIENT
Start: 2023-09-16 | End: 2023-09-16

## 2023-09-16 RX ORDER — POLYETHYLENE GLYCOL 3350 17 G/17G
17 POWDER, FOR SOLUTION ORAL
Qty: 510 | Refills: 0
Start: 2023-09-16 | End: 2023-10-15

## 2023-09-16 RX ORDER — ALBUTEROL 90 UG/1
3 AEROSOL, METERED ORAL
Refills: 0 | DISCHARGE

## 2023-09-16 RX ORDER — SENNA PLUS 8.6 MG/1
2 TABLET ORAL
Qty: 60 | Refills: 0
Start: 2023-09-16 | End: 2023-10-15

## 2023-09-16 RX ADMIN — PANTOPRAZOLE SODIUM 40 MILLIGRAM(S): 20 TABLET, DELAYED RELEASE ORAL at 07:02

## 2023-09-16 RX ADMIN — Medication 5 MILLIGRAM(S): at 13:13

## 2023-09-16 RX ADMIN — Medication 20 MILLIGRAM(S): at 07:02

## 2023-09-16 RX ADMIN — Medication 100 MILLIGRAM(S): at 00:24

## 2023-09-16 RX ADMIN — SODIUM CHLORIDE 60 MILLILITER(S): 9 INJECTION, SOLUTION INTRAVENOUS at 05:16

## 2023-09-16 RX ADMIN — Medication 650 MILLIGRAM(S): at 08:45

## 2023-09-16 RX ADMIN — LIDOCAINE 1 PATCH: 4 CREAM TOPICAL at 11:19

## 2023-09-16 RX ADMIN — HEPARIN SODIUM 5000 UNIT(S): 5000 INJECTION INTRAVENOUS; SUBCUTANEOUS at 07:03

## 2023-09-16 RX ADMIN — Medication 10 MILLIGRAM(S): at 13:13

## 2023-09-16 RX ADMIN — POLYETHYLENE GLYCOL 3350 17 GRAM(S): 17 POWDER, FOR SOLUTION ORAL at 11:19

## 2023-09-16 RX ADMIN — Medication 100 MILLIGRAM(S): at 07:03

## 2023-09-16 RX ADMIN — SENNA PLUS 2 TABLET(S): 8.6 TABLET ORAL at 13:13

## 2023-09-16 NOTE — DISCHARGE NOTE PROVIDER - NSDCFUADDAPPT_GEN_ALL_CORE_FT
- Please follow up with Dr. Christ Good in 2 weeks. The office is located on [address] [place] [address] Phone: [phone]. The office will call you on Monday with an appointment. If you do not receive a call please call them to make one.     - Please follow up with Dr. Teo Álvarez in 2 weeks. The office will call you on Monday with an appointment. If you do not receive a call please call them to make one.     -  Walk daily as tolerated and use your incentive spirometer 10 times every hour while you are awake.     - No driving or strenuous activity/exercise until cleared by your surgeon.    - Gently clean your incisions with unscented/antibacterial soap and water, pat dry.  You may leave them open to air.    - Call your doctor if you have shortness of breath, chest pain not relieved by pain medication, dizziness, fever >101.5, or increased redness or drainage from incisions.

## 2023-09-16 NOTE — DISCHARGE NOTE NURSING/CASE MANAGEMENT/SOCIAL WORK - NSDCFUADDAPPT_GEN_ALL_CORE_FT
- Please follow up with Dr. Christ Good in 2 weeks. The office is located on [address], [address] [place] [address] Phone: [phone]. The office will call you on Monday with an appointment. If you do not receive a call please call them to make one.     - Please follow up with Dr. Teo Álvarez in 2 weeks. The office will call you on Monday with an appointment. If you do not receive a call please call them to make one.     -  Walk daily as tolerated and use your incentive spirometer 10 times every hour while you are awake.     - No driving or strenuous activity/exercise until cleared by your surgeon.    - Gently clean your incisions with unscented/antibacterial soap and water, pat dry.  You may leave them open to air.    - Call your doctor if you have shortness of breath, chest pain not relieved by pain medication, dizziness, fever >101.5, or increased redness or drainage from incisions.

## 2023-09-16 NOTE — DISCHARGE NOTE PROVIDER - NSDCMRMEDTOKEN_GEN_ALL_CORE_FT
acetaminophen 325 mg oral tablet: 2 tab(s) orally every 6 hours as needed for Temp greater or equal to 38C (100.4F), Mild Pain (1 - 3)  ibuprofen 400 mg oral tablet: 1 tab(s) orally every 6 hours as needed for  mild pain  omeprazole 40 mg oral delayed release capsule: 1 orally once a day  polyethylene glycol 3350 oral powder for reconstitution: 17 gram(s) orally once a day  predniSONE 20 mg oral tablet: 1 orally once a day  senna leaf extract oral tablet: 2 tab(s) orally once a day  Trelegy Ellipta 200 mcg-62.5 mcg-25 mcg/inh inhalation powder: 1 inhaled once a day

## 2023-09-16 NOTE — DISCHARGE NOTE PROVIDER - PROVIDER TOKENS
FREE:[LAST:[Florentino],FIRST:[Christ],PHONE:[(   )    -],FAX:[(   )    -],ADDRESS:[[address] [place] [address],   [address] [place] [address]     Phone: [phone].]]

## 2023-09-16 NOTE — PROGRESS NOTE ADULT - ASSESSMENT
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 Patient is referred by Dr. Teo Álvarez. Sputum AFB smear is negativex3, culture negative x 4 weeks. CT scan reveals YARITZA and LLL interstitial lung disease. 9/15 pt underwent L VATS YARITZA and LLL wedge ressection with Dr. Good. OR course uneventful. Pt recovering in PACU with 1 CT to suction.     Plan:    Neurovascular:   -Pain well controlled with current regimen. PRN's: tylenol, toradol    Cardiovascular:   -Hemodynamically stable.   -Monitor: BP, HR, tele      Respiratory:   -Oxygenating well on room air  -Encourage continued use of IS 10x/hr and frequent ambulation  -CXR: Post op changes L lung, no PTX  -CT management    -1 L sided CT-continue with suction, no airleak    GI:  -GI PPX: protonix  -PO Diet  -Bowel Regimen: mirlaax    Renal / :  -Continue to monitor renal function: BUN/Cr pending  -Monitor I/O's daily     Endocrine:    -No hx of DM or thyroid dx    Hematologic:  -CBC: H/H- 15/46  -Coagulation Panel.    ID:  -Temperature: afebrile  -CBC: WBC- 10.6  -Continue to observe for SIRS/Sepsis Syndrome.    Prophylaxis:  -DVT prophylaxis with 5000 SubQ Heparin q8h.  -Continue with SCD's b/l while patient is at rest     Disposition:  -Discharge home once patient is medically ready  
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 Patient is referred by Pulm Dr. Teo Álvarez. CT chest (06/03/2023) revealed extensive interstitial pulmonary fibrosis and bronchiectasis on the left where there is volume loss and shift of the heart, mediastinum and trachea to the left with elevation of the left hemidiaphragm. There is less severe interstitial pulmonary disease on the right primarily peripherally in the right upper and right lower lobes. There is no evidence for congestive heart failure or pneumonia. There is no pleural effusion or pneumothorax. Thickening of the wall of the stomach and mass cannot be excluded. Pt underwent     L VATS, YARITZA & LLL wedge resection ( 9/15/23)  POD#1    - 9LA for HD monitoring   - active care per CTS, Dr. Vaca covering this weekend   - L CT x1 in place, possible CT removal and d/c home today   - f/u CXR in am reviewed   - postop iv abx: ceFAZolin   IVPB 2000 milliGRAM(s) IV Intermittent every 8 hours  -albuterol/ipratropium for Nebulization 3 milliLiter(s) Nebulizer every 6 hours PRN  - c/w home dose predniSONE   Tablet 20 milliGRAM(s) Oral daily  - pain control:   lidocaine   4% Patch 1 Patch Transdermal daily  ketorolac   Injectable 15 milliGRAM(s) IV Push every 4 hours PRN  - bowel regimen:   polyethylene glycol 3350 17 Gram(s) Oral daily  - GI ppx: pantoprazole    Tablet 40 milliGRAM(s) Oral before breakfast  - DVT ppx: heparin   Injectable 5000 Unit(s) SubCutaneous every 8 hours  - IC use at bedside  - OOBTC w. assistance as tolerated      spoke w. pt and daughter at bedside. possible CT removal and d/c home later today      Services continues to follow up with you, thank you for the consult.

## 2023-09-16 NOTE — PROGRESS NOTE ADULT - SUBJECTIVE AND OBJECTIVE BOX
OPERATION & DATE: 9/15    SUBJECTIVE ASSESSMENT: L VATS YARITZA and LL wedge ressection    VITAL SIGNS:  Vital Signs Last 24 Hrs  T(C): 36.1 (15 Sep 2023 10:10), Max: 36.8 (15 Sep 2023 07:22)  T(F): 97 (15 Sep 2023 10:10), Max: 97 (15 Sep 2023 10:10)  HR: 64 (15 Sep 2023 10:40) (64 - 81)  BP: 125/80 (15 Sep 2023 10:40) (110/74 - 131/83)  BP(mean): 98 (15 Sep 2023 10:40) (93 - 102)  RR: 21 (15 Sep 2023 10:40) (12 - 21)  SpO2: 100% (15 Sep 2023 10:40) (95% - 100%)    Parameters below as of 15 Sep 2023 10:10  Patient On (Oxygen Delivery Method): nasal cannula  O2 Flow (L/min): 2    I&O's Detail    15 Sep 2023 07:01  -  15 Sep 2023 11:09  --------------------------------------------------------  IN:    Other (mL): 1000 mL  Total IN: 1000 mL    OUT:  Total OUT: 0 mL    Total NET: 1000 mL        CHEST TUBE:  1 L sided CT to suction, no airleak  MISHA DRAIN:  none  EPICARDIAL WIRES: none  STITCHES: none  STAPLES: none  JOEL: none  CENTRAL LINE: none  MIDLINE/PICC: none  WOUND VAC: none    PHYSICAL EXAM:  General: resting comfortably in bed in NAD  Neurological: AOx3. Motor skills grossly intact  Cardiovascular: Normal S1/S2. Regular rate/rhythm. No murmurs  Respiratory: Lungs CTA bilaterally. No wheezing or rales  Gastrointestinal: +BS in all 4 quadrants. Non-distended. Soft. Non-tender  Extremities: Strength 5/5 b/l upper/lower extremities. Sensation grossly intact upper/lower extremities. No edema. No calf tenderness.  Vascular: Radial 2+bilaterally, DP 2+ b/l  Incision Sites: L VATS clean dry, intact      LABS:                        15.5   10.69 )-----------( 171      ( 15 Sep 2023 10:49 )             46.0     PT/INR - ( 15 Sep 2023 10:49 )   PT: 10.1 sec;   INR: 0.88          PTT - ( 15 Sep 2023 10:49 )  PTT:36.0 sec          MEDICATIONS  (STANDING):  acetaminophen   IVPB .. 1000 milliGRAM(s) IV Intermittent once  ceFAZolin   IVPB 2000 milliGRAM(s) IV Intermittent every 8 hours  heparin   Injectable 5000 Unit(s) SubCutaneous every 8 hours  influenza   Vaccine 0.5 milliLiter(s) IntraMuscular once  lactated ringers. 1000 milliLiter(s) (60 mL/Hr) IV Continuous <Continuous>  lidocaine   4% Patch 1 Patch Transdermal daily  metoclopramide Injectable 10 milliGRAM(s) IV Push once  pantoprazole    Tablet 40 milliGRAM(s) Oral before breakfast  predniSONE   Tablet 20 milliGRAM(s) Oral daily    MEDICATIONS  (PRN):  albuterol/ipratropium for Nebulization 3 milliLiter(s) Nebulizer every 6 hours PRN Shortness of Breath and/or Wheezing  ketorolac   Injectable 15 milliGRAM(s) IV Push every 4 hours PRN Moderate Pain (4 - 6)    RADIOLOGY & ADDITIONAL TESTS:      
Patient is a 52y old  Male who presents with a chief complaint of ILD, left lung nodule (15 Sep 2023 20:57)    INTERVAL EVENTS: NAEON    SUBJECTIVE:  Patient was seen and examined at bedside w. daughter, and CTS NAFISA Leiva. Pt in good spirits, pain over CT site ! 2-3/10, denies SOB. Last Bm prior admission on Thurs 9/14    Review of systems: No fever, chills, dizziness, HA, Changes in vision, CP, dyspnea, nausea or vomiting, dysuria, changes in bowel movements, LE edema. Rest of 12 point Review of systems negative unless otherwise documented elsewhere in note.     Diet, Regular (09-15-23 @ 16:50) [Active]      MEDICATIONS:  MEDICATIONS  (STANDING):  bisacodyl 5 milliGRAM(s) Oral daily  heparin   Injectable 5000 Unit(s) SubCutaneous every 8 hours  influenza   Vaccine 0.5 milliLiter(s) IntraMuscular once  lidocaine   4% Patch 1 Patch Transdermal daily  pantoprazole    Tablet 40 milliGRAM(s) Oral before breakfast  polyethylene glycol 3350 17 Gram(s) Oral daily  predniSONE   Tablet 20 milliGRAM(s) Oral daily  senna 2 Tablet(s) Oral daily    MEDICATIONS  (PRN):  acetaminophen     Tablet .. 650 milliGRAM(s) Oral every 6 hours PRN Temp greater or equal to 38C (100.4F), Mild Pain (1 - 3)  albuterol/ipratropium for Nebulization 3 milliLiter(s) Nebulizer every 6 hours PRN Shortness of Breath and/or Wheezing  ketorolac   Injectable 15 milliGRAM(s) IV Push every 4 hours PRN Moderate Pain (4 - 6)      Allergies    No Known Allergies    Intolerances        OBJECTIVE:  Vital Signs Last 24 Hrs  T(C): 36.2 (16 Sep 2023 09:07), Max: 37.2 (15 Sep 2023 17:08)  T(F): 97.1 (16 Sep 2023 09:07), Max: 98.9 (15 Sep 2023 17:08)  HR: 66 (16 Sep 2023 08:35) (54 - 74)  BP: 98/68 (16 Sep 2023 08:35) (95/63 - 125/81)  BP(mean): 78 (16 Sep 2023 08:35) (75 - 99)  RR: 16 (16 Sep 2023 08:35) (14 - 26)  SpO2: 96% (16 Sep 2023 08:35) (96% - 100%)    Parameters below as of 16 Sep 2023 08:35  Patient On (Oxygen Delivery Method): room air      I&O's Summary    15 Sep 2023 07:01  -  16 Sep 2023 07:00  --------------------------------------------------------  IN: 2765 mL / OUT: 735 mL / NET: 2030 mL    16 Sep 2023 07:01  -  16 Sep 2023 10:30  --------------------------------------------------------  IN: 0 mL / OUT: 20 mL / NET: -20 mL        PHYSICAL EXAM:  Gen: Reclining in bed at time of exam, appears stated age  HEENT: NCAT, MMM, clear OP  Neck: supple, trachea at midline  CV: RRR, +S1/S2  Pulm: adequate respiratory effort, no increase in work of breathing, L CT x1 in place   Abd: soft, NTND  Skin: warm and dry,   Ext: WWP, no LE edema  Neuro: AOx3, no gross focal neurological deficits  Psych: affect and behavior appropriate, pleasant at time of interview  :     LABS:                        15.6   13.54 )-----------( 189      ( 16 Sep 2023 07:24 )             47.1     09-16    140  |  102  |  12  ----------------------------<  136<H>  4.9   |  30  |  0.82    Ca    9.8      16 Sep 2023 07:24        PT/INR - ( 15 Sep 2023 10:49 )   PT: 10.1 sec;   INR: 0.88          PTT - ( 15 Sep 2023 10:49 )  PTT:36.0 sec  CAPILLARY BLOOD GLUCOSE        Urinalysis Basic - ( 16 Sep 2023 07:24 )    Color: x / Appearance: x / SG: x / pH: x  Gluc: 136 mg/dL / Ketone: x  / Bili: x / Urobili: x   Blood: x / Protein: x / Nitrite: x   Leuk Esterase: x / RBC: x / WBC x   Sq Epi: x / Non Sq Epi: x / Bacteria: x        MICRODATA:    Culture - Tissue with Gram Stain (collected 15 Sep 2023 09:53)  Source: .Tissue (2) Left Upper Lobe Wedge  Gram Stain (15 Sep 2023 16:35):    No organisms seen    No WBC's seen.  Preliminary Report (16 Sep 2023 08:49):    No growth to date    Culture - Tissue with Gram Stain (collected 15 Sep 2023 09:53)  Source: .Tissue (1) Left Lower Lung Wedge  Gram Stain (15 Sep 2023 16:36):    No organisms seen    No WBC's seen.  Preliminary Report (16 Sep 2023 08:50):    No growth to date        RADIOLOGY/OTHER STUDIES:    PCP  Pharmacy:   Emergency contact:

## 2023-09-16 NOTE — DISCHARGE NOTE PROVIDER - CARE PROVIDER_API CALL
Christ Good  [address],   [address] [place] [address]     Phone: [phone].  Phone: (   )    -  Fax: (   )    -  Follow Up Time:

## 2023-09-16 NOTE — DISCHARGE NOTE PROVIDER - HOSPITAL COURSE
Patient discussed on morning rounds with Dr. Vaca     Operation Date: 9/15/23: L VATS RA YARITZA and LLL wedge resections     Primary Surgeon/Attending MD: Dr. Christ Good	    Referring Physician: Dr. Teo Álvarez   _ _ _ _ _ _ _ _ _ _ _ _  HOSPITAL COURSE:  52 year old Mandarin speaking male, former light smoker (smoked 2-3 cigarette/week), no significant medical history, who worked in [employer] for over 10 years presents with c/o persistent dry cough and SOB on exertion. Sputum AFB smear is negativex3, culture negative x 4 weeks. CT scan reveals YARITZA and LLL interstitial lung disease. 9/15 pt underwent L VATS YARITZA and LLL wedge resection with Dr. Good. OR course uneventful and pt was transferred to PACU with 1 CT to suction. POD 0 overnight CT was placed to waterseal, POD 1 AM CXR stable. Ct was removed, CXR stable. Pain is well controlled, he is tolerating PO diet, passing gas, ambulating independently and voiding freely. Per Dr. Vaca he is medically stable for discharge home.    _ _ _ _ _ _ _ _ _ _ _ _  DISCHARGE PHYSICAL EXAM:  General: Sitting in chair NAD  HEENT: NCAT MMM EOMI neck supple   Neurological: A&O x3, no focal deficits  Cardiovascular: RRR, normal s1 s2, no M/R/G  Respiratory:  CTA b/l, no W/R/R  Gastrointestinal: soft, non-tender to palpation, non-distended  Extremities: WWP, no pitting edema  Vascular: peripheral pulses palpable   Incision: L VATS incisions CDI no drainage no erythema   _ _ _ _  _ _ _ _ _ _ _ _  REMOVAL CHECKLIST:        [ ] Epicardial wires          [ ] Stitches/tie downs,   If no, why? _NO THORACIC TUBE _____          [ ] PICC/Midline,   If no, why? ________    _ _ _ _ _ _ _ _ _  Over 35 minutes was spent with the patient reviewing the discharge material including medications, follow up appointments, recovery, concerning symptoms, and how to contact their health care providers if they have questions Patient discussed on morning rounds with Dr. Vaca     Operation Date: 9/15/23: L VATS RA YARITZA and LLL wedge resections     Primary Surgeon/Attending MD: Dr. Christ Good	    Referring Physician: Dr. Teo Álvarez   _ _ _ _ _ _ _ _ _ _ _ _  HOSPITAL COURSE:  52 year old Mandarin speaking male, former light smoker (smoked 2-3 cigarette/week), no significant medical history, who worked in [employer] for over 10 years presents with c/o persistent dry cough and SOB on exertion. Sputum AFB smear is negativex3, culture negative x 4 weeks. CT scan reveals YARITZA and LLL interstitial lung disease. 9/15 pt underwent L VATS YARITZA and LLL wedge resection with Dr. Good. OR course uneventful and pt was transferred to PACU with 1 CT to suction. POD 0 overnight CT was placed to waterseal, POD 1 AM CXR stable. Ct was removed, CXR stable. Pain is well controlled, he is tolerating PO diet, passing gas, ambulating independently and voiding freely. Per Dr. Vaca he is medically stable for discharge home.  Reglan and bowel regimen given for gastric bubble on CXR.   _ _ _ _ _ _ _ _ _ _ _ _  DISCHARGE PHYSICAL EXAM:  General: Sitting in chair NAD  HEENT: NCAT MMM EOMI neck supple   Neurological: A&O x3, no focal deficits  Cardiovascular: RRR, normal s1 s2, no M/R/G  Respiratory:  CTA b/l, no W/R/R  Gastrointestinal: soft, non-tender to palpation, non-distended  Extremities: WWP, no pitting edema  Vascular: peripheral pulses palpable   Incision: L VATS incisions CDI no drainage no erythema   _ _ _ _  _ _ _ _ _ _ _ _  REMOVAL CHECKLIST:        [ ] Epicardial wires          [ ] Stitches/tie downs,   If no, why? _NO THORACIC TUBE _____          [ ] PICC/Midline,   If no, why? ________    _ _ _ _ _ _ _ _ _  Over 35 minutes was spent with the patient reviewing the discharge material including medications, follow up appointments, recovery, concerning symptoms, and how to contact their health care providers if they have questions

## 2023-09-16 NOTE — DISCHARGE NOTE PROVIDER - NSCORESITESY/N_GEN_A_CORE_RD
No
Detail Level: Zone
Quality 402: Tobacco Use And Help With Quitting Among Adolescents: Patient screened for tobacco and never smoked

## 2023-09-16 NOTE — DISCHARGE NOTE PROVIDER - NSDCCPCAREPLAN_GEN_ALL_CORE_FT
PRINCIPAL DISCHARGE DIAGNOSIS  Diagnosis: Lung interstitial disease  Assessment and Plan of Treatment:

## 2023-09-16 NOTE — DISCHARGE NOTE PROVIDER - NSDCCPTREATMENT_GEN_ALL_CORE_FT
PRINCIPAL PROCEDURE  Procedure: Robot-assisted thoracoscopic wedge resection of lung  Findings and Treatment: left upper lobe and left lower lobe wedge resection

## 2023-09-16 NOTE — DISCHARGE NOTE NURSING/CASE MANAGEMENT/SOCIAL WORK - PATIENT PORTAL LINK FT
You can access the FollowMyHealth Patient Portal offered by [facility] by registering at the following website: [email or web address]. By joining [employer]’s FollowMyHealth portal, you will also be able to view your health information using other applications (apps) compatible with our system.

## 2023-09-18 PROBLEM — J45.909 UNSPECIFIED ASTHMA, UNCOMPLICATED: Chronic | Status: ACTIVE | Noted: 2023-09-14

## 2023-09-18 PROBLEM — J44.9 CHRONIC OBSTRUCTIVE PULMONARY DISEASE, UNSPECIFIED: Chronic | Status: ACTIVE | Noted: 2023-09-13

## 2023-09-18 PROBLEM — K21.9 GASTRO-ESOPHAGEAL REFLUX DISEASE WITHOUT ESOPHAGITIS: Chronic | Status: ACTIVE | Noted: 2023-09-13

## 2023-09-20 DIAGNOSIS — R91.8 OTHER NONSPECIFIC ABNORMAL FINDING OF LUNG FIELD: ICD-10-CM

## 2023-09-20 DIAGNOSIS — Z79.52 LONG TERM (CURRENT) USE OF SYSTEMIC STEROIDS: ICD-10-CM

## 2023-09-20 DIAGNOSIS — Z87.891 PERSONAL HISTORY OF NICOTINE DEPENDENCE: ICD-10-CM

## 2023-09-20 DIAGNOSIS — J84.9 INTERSTITIAL PULMONARY DISEASE, UNSPECIFIED: ICD-10-CM

## 2023-09-20 DIAGNOSIS — K21.9 GASTRO-ESOPHAGEAL REFLUX DISEASE WITHOUT ESOPHAGITIS: ICD-10-CM

## 2023-09-20 LAB
CULTURE RESULTS: NO GROWTH — SIGNIFICANT CHANGE UP
CULTURE RESULTS: NO GROWTH — SIGNIFICANT CHANGE UP
SPECIMEN SOURCE: SIGNIFICANT CHANGE UP
SPECIMEN SOURCE: SIGNIFICANT CHANGE UP

## 2023-09-25 LAB — SURGICAL PATHOLOGY STUDY: SIGNIFICANT CHANGE UP

## 2023-09-29 ENCOUNTER — APPOINTMENT (OUTPATIENT)
Dept: THORACIC SURGERY | Facility: CLINIC | Age: 53
End: 2023-09-29
Payer: COMMERCIAL

## 2023-10-06 ENCOUNTER — APPOINTMENT (OUTPATIENT)
Dept: THORACIC SURGERY | Facility: CLINIC | Age: 53
End: 2023-10-06
Payer: COMMERCIAL

## 2023-10-06 ENCOUNTER — INPATIENT (INPATIENT)
Facility: HOSPITAL | Age: 53
LOS: 7 days | Discharge: HOME HEALTH | DRG: 200 | End: 2023-10-14
Attending: THORACIC SURGERY (CARDIOTHORACIC VASCULAR SURGERY) | Admitting: THORACIC SURGERY (CARDIOTHORACIC VASCULAR SURGERY)
Payer: COMMERCIAL

## 2023-10-06 ENCOUNTER — OUTPATIENT (OUTPATIENT)
Dept: OUTPATIENT SERVICES | Facility: HOSPITAL | Age: 53
LOS: 1 days | End: 2023-10-06
Payer: COMMERCIAL

## 2023-10-06 VITALS
HEART RATE: 113 BPM | OXYGEN SATURATION: 96 % | TEMPERATURE: 98 F | RESPIRATION RATE: 16 BRPM | WEIGHT: 121 LBS | DIASTOLIC BLOOD PRESSURE: 77 MMHG | SYSTOLIC BLOOD PRESSURE: 109 MMHG | BODY MASS INDEX: 18.77 KG/M2 | HEIGHT: 67.5 IN

## 2023-10-06 VITALS
SYSTOLIC BLOOD PRESSURE: 110 MMHG | RESPIRATION RATE: 18 BRPM | HEIGHT: 68 IN | DIASTOLIC BLOOD PRESSURE: 76 MMHG | OXYGEN SATURATION: 97 % | TEMPERATURE: 98 F | HEART RATE: 110 BPM

## 2023-10-06 DIAGNOSIS — R06.02 SHORTNESS OF BREATH: ICD-10-CM

## 2023-10-06 LAB
ANION GAP SERPL CALC-SCNC: 12 MMOL/L — SIGNIFICANT CHANGE UP (ref 5–17)
APTT BLD: 31.3 SEC — SIGNIFICANT CHANGE UP (ref 24.5–35.6)
BASOPHILS # BLD AUTO: 0.06 K/UL — SIGNIFICANT CHANGE UP (ref 0–0.2)
BASOPHILS NFR BLD AUTO: 0.5 % — SIGNIFICANT CHANGE UP (ref 0–2)
BLD GP AB SCN SERPL QL: NEGATIVE — SIGNIFICANT CHANGE UP
BUN SERPL-MCNC: 17 MG/DL — SIGNIFICANT CHANGE UP (ref 7–23)
CALCIUM SERPL-MCNC: 9.7 MG/DL — SIGNIFICANT CHANGE UP (ref 8.4–10.5)
CHLORIDE SERPL-SCNC: 101 MMOL/L — SIGNIFICANT CHANGE UP (ref 96–108)
CO2 SERPL-SCNC: 29 MMOL/L — SIGNIFICANT CHANGE UP (ref 22–31)
CREAT SERPL-MCNC: 0.82 MG/DL — SIGNIFICANT CHANGE UP (ref 0.5–1.3)
EGFR: 106 ML/MIN/1.73M2 — SIGNIFICANT CHANGE UP
EOSINOPHIL # BLD AUTO: 0.17 K/UL — SIGNIFICANT CHANGE UP (ref 0–0.5)
EOSINOPHIL NFR BLD AUTO: 1.3 % — SIGNIFICANT CHANGE UP (ref 0–6)
GLUCOSE SERPL-MCNC: 94 MG/DL — SIGNIFICANT CHANGE UP (ref 70–99)
HCT VFR BLD CALC: 46.1 % — SIGNIFICANT CHANGE UP (ref 39–50)
HGB BLD-MCNC: 15.7 G/DL — SIGNIFICANT CHANGE UP (ref 13–17)
IMM GRANULOCYTES NFR BLD AUTO: 1.5 % — HIGH (ref 0–0.9)
INR BLD: 0.87 — SIGNIFICANT CHANGE UP (ref 0.85–1.18)
LYMPHOCYTES # BLD AUTO: 1.91 K/UL — SIGNIFICANT CHANGE UP (ref 1–3.3)
LYMPHOCYTES # BLD AUTO: 14.7 % — SIGNIFICANT CHANGE UP (ref 13–44)
MCHC RBC-ENTMCNC: 31.7 PG — SIGNIFICANT CHANGE UP (ref 27–34)
MCHC RBC-ENTMCNC: 34.1 GM/DL — SIGNIFICANT CHANGE UP (ref 32–36)
MCV RBC AUTO: 93.1 FL — SIGNIFICANT CHANGE UP (ref 80–100)
MONOCYTES # BLD AUTO: 0.89 K/UL — SIGNIFICANT CHANGE UP (ref 0–0.9)
MONOCYTES NFR BLD AUTO: 6.8 % — SIGNIFICANT CHANGE UP (ref 2–14)
NEUTROPHILS # BLD AUTO: 9.8 K/UL — HIGH (ref 1.8–7.4)
NEUTROPHILS NFR BLD AUTO: 75.2 % — SIGNIFICANT CHANGE UP (ref 43–77)
NRBC # BLD: 0 /100 WBCS — SIGNIFICANT CHANGE UP (ref 0–0)
PLATELET # BLD AUTO: 263 K/UL — SIGNIFICANT CHANGE UP (ref 150–400)
POTASSIUM SERPL-MCNC: 3.1 MMOL/L — LOW (ref 3.5–5.3)
POTASSIUM SERPL-SCNC: 3.1 MMOL/L — LOW (ref 3.5–5.3)
PROTHROM AB SERPL-ACNC: 10 SEC — SIGNIFICANT CHANGE UP (ref 9.5–13)
RBC # BLD: 4.95 M/UL — SIGNIFICANT CHANGE UP (ref 4.2–5.8)
RBC # FLD: 12.4 % — SIGNIFICANT CHANGE UP (ref 10.3–14.5)
RH IG SCN BLD-IMP: POSITIVE — SIGNIFICANT CHANGE UP
SODIUM SERPL-SCNC: 142 MMOL/L — SIGNIFICANT CHANGE UP (ref 135–145)
WBC # BLD: 13.02 K/UL — HIGH (ref 3.8–10.5)
WBC # FLD AUTO: 13.02 K/UL — HIGH (ref 3.8–10.5)

## 2023-10-06 PROCEDURE — 99024 POSTOP FOLLOW-UP VISIT: CPT

## 2023-10-06 PROCEDURE — 99285 EMERGENCY DEPT VISIT HI MDM: CPT

## 2023-10-06 PROCEDURE — 71046 X-RAY EXAM CHEST 2 VIEWS: CPT

## 2023-10-06 PROCEDURE — 71046 X-RAY EXAM CHEST 2 VIEWS: CPT | Mod: 26

## 2023-10-06 PROCEDURE — 71045 X-RAY EXAM CHEST 1 VIEW: CPT | Mod: 26

## 2023-10-06 RX ORDER — ACETAMINOPHEN 500 MG
650 TABLET ORAL EVERY 6 HOURS
Refills: 0 | Status: DISCONTINUED | OUTPATIENT
Start: 2023-10-06 | End: 2023-10-14

## 2023-10-06 RX ORDER — MORPHINE SULFATE 50 MG/1
4 CAPSULE, EXTENDED RELEASE ORAL ONCE
Refills: 0 | Status: DISCONTINUED | OUTPATIENT
Start: 2023-10-06 | End: 2023-10-06

## 2023-10-06 RX ORDER — SODIUM CHLORIDE 9 MG/ML
3 INJECTION INTRAMUSCULAR; INTRAVENOUS; SUBCUTANEOUS EVERY 8 HOURS
Refills: 0 | Status: DISCONTINUED | OUTPATIENT
Start: 2023-10-06 | End: 2023-10-14

## 2023-10-06 RX ORDER — HEPARIN SODIUM 5000 [USP'U]/ML
5000 INJECTION INTRAVENOUS; SUBCUTANEOUS EVERY 8 HOURS
Refills: 0 | Status: DISCONTINUED | OUTPATIENT
Start: 2023-10-06 | End: 2023-10-14

## 2023-10-06 RX ORDER — FLUTICASONE FUROATE, UMECLIDINIUM BROMIDE AND VILANTEROL TRIFENATATE 200; 62.5; 25 UG/1; UG/1; UG/1
1 POWDER RESPIRATORY (INHALATION)
Refills: 0 | DISCHARGE

## 2023-10-06 RX ORDER — OMEPRAZOLE 10 MG/1
1 CAPSULE, DELAYED RELEASE ORAL
Refills: 0 | DISCHARGE

## 2023-10-06 RX ORDER — POTASSIUM CHLORIDE 20 MEQ
40 PACKET (EA) ORAL ONCE
Refills: 0 | Status: DISCONTINUED | OUTPATIENT
Start: 2023-10-06 | End: 2023-10-13

## 2023-10-06 RX ORDER — PANTOPRAZOLE SODIUM 20 MG/1
40 TABLET, DELAYED RELEASE ORAL
Refills: 0 | Status: DISCONTINUED | OUTPATIENT
Start: 2023-10-06 | End: 2023-10-14

## 2023-10-06 RX ORDER — POLYETHYLENE GLYCOL 3350 17 G/17G
17 POWDER, FOR SOLUTION ORAL DAILY
Refills: 0 | Status: DISCONTINUED | OUTPATIENT
Start: 2023-10-06 | End: 2023-10-14

## 2023-10-06 RX ORDER — INFLUENZA VIRUS VACCINE 15; 15; 15; 15 UG/.5ML; UG/.5ML; UG/.5ML; UG/.5ML
0.5 SUSPENSION INTRAMUSCULAR ONCE
Refills: 0 | Status: DISCONTINUED | OUTPATIENT
Start: 2023-10-06 | End: 2023-10-14

## 2023-10-06 RX ADMIN — HEPARIN SODIUM 5000 UNIT(S): 5000 INJECTION INTRAVENOUS; SUBCUTANEOUS at 21:36

## 2023-10-06 RX ADMIN — SODIUM CHLORIDE 3 MILLILITER(S): 9 INJECTION INTRAMUSCULAR; INTRAVENOUS; SUBCUTANEOUS at 21:46

## 2023-10-06 RX ADMIN — MORPHINE SULFATE 4 MILLIGRAM(S): 50 CAPSULE, EXTENDED RELEASE ORAL at 16:54

## 2023-10-06 NOTE — ED PROVIDER NOTE - OBJECTIVE STATEMENT
52 year old male, former light smoker (smoked 2-3 cigarette/week), ILD sp L VATS 9/15 presenting from CT office for incidental L PTX. Pt without any complaints. No cp or sob. No lightheadedness or dizziness. No ho PTX in past. ROS as above.

## 2023-10-06 NOTE — ED PROVIDER NOTE - CLINICAL SUMMARY MEDICAL DECISION MAKING FREE TEXT BOX
53 yo m with ptx, no signs of tension, vs reassuring, in no distress. Placed on NRB. CT surgery aware, pt on monitor.

## 2023-10-06 NOTE — PATIENT PROFILE ADULT - NSPROGENBLOODRESTRICT_GEN_A_NUR
Problem: Heart Failure: Day 5  Goal: Treatments/Interventions/Procedures  Outcome: Progressing Towards Goal     Last 3 Recorded Weights in this Encounter    07/24/19 0438 07/25/19 0354 07/26/19 0410   Weight: 59 kg (130 lb) 58 kg (127 lb 13.9 oz) 60.1 kg (132 lb 7.9 oz)      Weight change: 2.1 kg (4 lb 10.1 oz)     Patient will get dialysis today , educated on low Na diet, daily weights none

## 2023-10-06 NOTE — ED ADULT NURSE NOTE - OBJECTIVE STATEMENT
Alert-The patient is alert, awake and responds to voice. The patient is oriented to time, place, and person. The triage nurse is able to obtain subjective information. Pt is 52M referred to ED d/t outpt CXR showing pneumothorax. Pt denies chest pain, chest discomfort, shortness of breath. Pt has healing incision scars to L chest, states from lung biopsy procedure. Breath sounds on R clear, no breath sounds heard over L lung fields. No tracheal deviation. Pt states "sometimes I just feel vibrations in my chest and I can't get a full breath out." Pt appears comfortable in stretcher, skin warm dry normal for race. Continuous cardiac/pulse oximetry monitoring initiated. Pt placed on NRB.  ID [id number]

## 2023-10-06 NOTE — H&P ADULT - HISTORY OF PRESENT ILLNESS
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 Sputum AFB smear is negativex3, culture negative x 4 weeks. CT scan reveals YARITZA and LLL interstitial lung disease. 9/15 pt underwent L VATS YARITZA and LLL wedge resection with Dr. Good. OR course uneventful and recovered post op without difficulty. Pt reports feeling some new L sided CP last week with some associated crackling. At his routine f/u appointment this morning, CXR revealed large PTX. Sent to ER for admission to Dr. Good with pigtail insertion.

## 2023-10-06 NOTE — ED ADULT NURSE NOTE - SUICIDE SCREENING QUESTION 1
Gastroenterology Outpatient History and Physical    Patient: Alyse Vann    Physician: Cm Pierce MD    Vital Signs: Blood pressure 142/74, pulse 61, temperature 98.3 °F (36.8 °C), resp. rate 19, height 5' 6\" (1.676 m), weight 67.8 kg (149 lb 9 oz), SpO2 98 %, not currently breastfeeding. Allergies: Allergies   Allergen Reactions    Codeine Nausea and Vomiting       Chief Complaint: Epigastric pain, Constipation    History of Present Illness: 77 yo F for EGD for epigastric pain also recent diverticulitis and constipation for colonoscopy. Justification for Procedure: above    History:  Past Medical History:   Diagnosis Date    Arrhythmia     ? Jonnie Eisenmenger    Colon polyps     Dr Law Christian Diverticulitis     GERD (gastroesophageal reflux disease)     OCCASIONALLY    Hypertension     Nausea & vomiting     Other ill-defined conditions(799.89)     HI CHOLESTEROL    Other ill-defined conditions(799.89)     DIVERTICULOSIS (HX: DIVERTICULITIS)    Psychiatric disorder     DEPRESSION      Past Surgical History:   Procedure Laterality Date    ABDOMEN SURGERY PROC UNLISTED      LYSIS OF ADHESIONS    HX APPENDECTOMY      HX CATARACT REMOVAL      BILAT WITH IOL    HX HYSTERECTOMY      HX KNEE ARTHROSCOPY      RIGHT KNEE     HX MOHS PROCEDURES      RIGHT SHOULDER--ROTATOR CUFF    HX OOPHORECTOMY      HX ORTHOPAEDIC  6/2009    RIGHT TKR    HX TUBAL LIGATION      HX UROLOGICAL      bladder sling    HI COLONOSCOPY FLX DX W/COLLJ SPEC WHEN PFRMD  6/25/2012           Social History     Socioeconomic History    Marital status:      Spouse name: Not on file    Number of children: Not on file    Years of education: Not on file    Highest education level: Not on file   Tobacco Use    Smoking status: Never Smoker    Smokeless tobacco: Never Used   Substance and Sexual Activity    Alcohol use: Yes     Comment: VERY RARE    Drug use: No    Sexual activity: Yes     Partners: Male      Family History   Problem Relation Age of Onset    Cancer Other         colon cancer    Post-op Nausea/Vomiting Sister     Breast Cancer Sister         over 48    Post-op Nausea/Vomiting Brother        Medications:   Prior to Admission medications    Medication Sig Start Date End Date Taking? Authorizing Provider   linaclotide (LINZESS PO) Take  by mouth. Yes Provider, Historical   timolol maleate 0.5 % DrpD ophthalmic solution Administer 1 Drop to left eye nightly. 3/18/11  Yes Provider, Historical   FLUOROMETHOLONE (FML FORTE OP) Apply 1 Drop to eye daily (before breakfast). 3/18/11  Yes Provider, Historical   lisinopril (PRINIVIL, ZESTRIL) 20 mg tablet Take  by mouth daily. Yes Provider, Historical   diclofenac EC (VOLTAREN) 75 mg EC tablet Take  by mouth. Other, MD Declan   ondansetron (ZOFRAN ODT) 4 mg disintegrating tablet Take 1 Tab by mouth every eight (8) hours as needed for Nausea. 5/15/19   Yarilisa Armentas,    acetaminophen (TYLENOL EXTRA STRENGTH) 500 mg tablet Take 1,000 mg by mouth every six (6) hours as needed for Pain. eDclan Arreola MD   butalbital-acetaminophen-caff (FIORICET) -40 mg per capsule Take 1 Cap by mouth every four (4) hours as needed for Pain. Max Daily Amount: 6 Caps. 10/5/17   Kamilah Sauceda MD   aspirin 81 mg chewable tablet Take 81 mg by mouth daily. Declan Arreola MD   omega-3 fatty acids-vitamin e (FISH OIL) 1,000 mg cap Take 1 Cap by mouth. Declan Arreola MD   Calcium-Cholecalciferol, D3, (CALCIUM 600 WITH VITAMIN D3) 600 mg(1,500mg) -400 unit chew Take 600 mg by mouth. Declan Arreola MD   lovastatin (MEVACOR) 20 mg tablet Take 20 mg by mouth nightly. Provider, Historical   citalopram (CELEXA) 20 mg tablet Take 10 mg by mouth nightly. 3/18/11   Provider, Historical       Physical Exam:   General: alert, no distress   HEENT: Head: Normocephalic, no lesions, without obvious abnormality.    Heart: regular rate and rhythm, S1, S2 normal, no murmur, click, rub or gallop   Lungs: chest clear, no wheezing, rales, normal symmetric air entry   Abdominal: soft, Nontender, Nondistended, + BS   Neurological: Grossly normal   Extremities: extremities normal, atraumatic, no cyanosis or edema     Findings/Diagnosis: Epigastric pain, Constipation    Plan of Care/Planned Procedure: EGD and Colonoscopy No

## 2023-10-06 NOTE — H&P ADULT - NSHPPHYSICALEXAM_GEN_ALL_CORE
PHYSICAL EXAM:  General: resting comfortably in bed in NAD  Neurological: AOx3. Motor skills grossly intact  Cardiovascular: Normal S1/S2. Regular rate/rhythm. No murmurs  Respiratory: Lungs CTA bilaterally. No wheezing or rales  Gastrointestinal: +BS in all 4 quadrants. Non-distended. Soft. Non-tender  Extremities: Strength 5/5 b/l upper/lower extremities. Sensation grossly intact upper/lower extremities. No edema. No calf tenderness.  Vascular: Radial 2+bilaterally, DP 2+ b/l  Incision Sites: VATS incisions well healed

## 2023-10-06 NOTE — ED PROVIDER NOTE - PHYSICAL EXAMINATION
general: Well appearing, in no acute distress  HEENT: Normocephalic, atraumatic, extraocular movements intact  CV: Regular rate  Pulm: No breath sounds on L  Abd: Flat, no gross distension  Ext: warm and well perfused  Skin: No gross rashes or lesions  Neuro: Alert and oriented, moving all extremities

## 2023-10-06 NOTE — H&P ADULT - ASSESSMENT
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 Sputum AFB smear is negativex3, culture negative x 4 weeks. CT scan reveals YARITZA and LLL interstitial lung disease. 9/15 pt underwent L VATS YARITZA and LLL wedge resection with Dr. oGod. OR course uneventful and recovered post op without difficulty. Pt reports feeling some new L sided CP last week with some associated crackling. At his routine f/u appointment this morning, CXR revealed large PTX. Sent to ER for admission to Dr. Good with pigtail insertion.     Plan:    Neurovascular:   -Pain well controlled with current regimen. PRN's: tylenol    Cardiovascular:   -Hemodynamically stable.   -Monitor: BP, HR, tele    Respiratory:   -Oxygenating well on room air  -Encourage continued use of IS 10x/hr and frequent ambulation  -CXR: pending post pigtail placement, pending confirmatory xray  -CT to suction    GI:  -GI PPX: protonix  -PO Diet  -Bowel Regimen: miralax    Renal / :  -Continue to monitor renal function: BUN/Cr 17/.89  -Monitor I/O's daily     Endocrine:    -No hx of DM or thyroid dx  -A1c: pending  -TSH: pending     Hematologic:  -CBC: H/H- 15.7/46  -Coagulation Panel.    ID:  -Temperature: afebrile  -CBC: WBC- 13  -Continue to observe for SIRS/Sepsis Syndrome.    Prophylaxis:  -DVT prophylaxis with 5000 SubQ Heparin q8h.  -Continue with SCD's b/l while patient is at rest     Disposition:  -Discharge home once patient is medically ready

## 2023-10-06 NOTE — H&P ADULT - NSHPREVIEWOFSYSTEMS_GEN_ALL_CORE
Review of Systems  CONSTITUTIONAL:  Denies Fevers / chills, sweats, fatigue, weight loss, weight gain                                      NEURO:  Denies changes in sensation, seizures, syncope, confusion                                                                            EYES:  Denies Blurry vision, discharge, pain, loss of vision                                                                                    ENMT:  Denies Difficulty hearing, vertigo, dysphagia, epistaxis, recent dental work                                       CV:  L chest pain, palpitations, GLASGOW, orthopnea                                                                                          RESPIRATORY:  Denies Wheezing, SOB, cough / sputum, hemoptysis                                                                GI:  Denies Nausea, vomiting, diarrhea, constipation, melena, difficulty swallowing                                               : Denies Hematuria, dysuria, urgency, incontinence                                                                                         MUSKULOSKELETAL:  Denies arthritis, joint swelling, muscle weakness                                                             SKIN/BREAST:  Denies rash, itching, hair loss, masses                                                                                            PSYCH:  Denies depression, anxiety, suicidal ideation                                                                                               HEME/LYMPH:  Denies bruises easily, enlarged lymph nodes, tender lymph nodes                                        ENDOCRINE:  Denies cold intolerance, heat intolerance, polydipsia

## 2023-10-06 NOTE — ED ADULT NURSE NOTE - NSFALLUNIVINTERV_ED_ALL_ED
Bed/Stretcher in lowest position, wheels locked, appropriate side rails in place/Call bell, personal items and telephone in reach/Instruct patient to call for assistance before getting out of bed/chair/stretcher/Non-slip footwear applied when patient is off stretcher/[place] to call system/Physically safe environment - no spills, clutter or unnecessary equipment/Purposeful proactive rounding/Room/bathroom lighting operational, light cord in reach

## 2023-10-06 NOTE — ED PROVIDER NOTE - CCCP TRG CHIEF CMPLNT
Pt instructed to follow up with ENT provider. Pt agrees to discharge instructions.      Terrie Templeton RN  03/09/23 9721
shortness of breath

## 2023-10-07 LAB
ANION GAP SERPL CALC-SCNC: 13 MMOL/L — SIGNIFICANT CHANGE UP (ref 5–17)
BUN SERPL-MCNC: 19 MG/DL — SIGNIFICANT CHANGE UP (ref 7–23)
CALCIUM SERPL-MCNC: 9.6 MG/DL — SIGNIFICANT CHANGE UP (ref 8.4–10.5)
CHLORIDE SERPL-SCNC: 101 MMOL/L — SIGNIFICANT CHANGE UP (ref 96–108)
CO2 SERPL-SCNC: 25 MMOL/L — SIGNIFICANT CHANGE UP (ref 22–31)
CREAT SERPL-MCNC: 0.74 MG/DL — SIGNIFICANT CHANGE UP (ref 0.5–1.3)
EGFR: 109 ML/MIN/1.73M2 — SIGNIFICANT CHANGE UP
GLUCOSE SERPL-MCNC: 108 MG/DL — HIGH (ref 70–99)
HCT VFR BLD CALC: 45.8 % — SIGNIFICANT CHANGE UP (ref 39–50)
HGB BLD-MCNC: 15.1 G/DL — SIGNIFICANT CHANGE UP (ref 13–17)
MAGNESIUM SERPL-MCNC: 2.2 MG/DL — SIGNIFICANT CHANGE UP (ref 1.6–2.6)
MCHC RBC-ENTMCNC: 31.3 PG — SIGNIFICANT CHANGE UP (ref 27–34)
MCHC RBC-ENTMCNC: 33 GM/DL — SIGNIFICANT CHANGE UP (ref 32–36)
MCV RBC AUTO: 94.8 FL — SIGNIFICANT CHANGE UP (ref 80–100)
NRBC # BLD: 0 /100 WBCS — SIGNIFICANT CHANGE UP (ref 0–0)
PLATELET # BLD AUTO: 219 K/UL — SIGNIFICANT CHANGE UP (ref 150–400)
POTASSIUM SERPL-MCNC: 3.9 MMOL/L — SIGNIFICANT CHANGE UP (ref 3.5–5.3)
POTASSIUM SERPL-SCNC: 3.9 MMOL/L — SIGNIFICANT CHANGE UP (ref 3.5–5.3)
RBC # BLD: 4.83 M/UL — SIGNIFICANT CHANGE UP (ref 4.2–5.8)
RBC # FLD: 12.3 % — SIGNIFICANT CHANGE UP (ref 10.3–14.5)
SODIUM SERPL-SCNC: 139 MMOL/L — SIGNIFICANT CHANGE UP (ref 135–145)
WBC # BLD: 11.56 K/UL — HIGH (ref 3.8–10.5)
WBC # FLD AUTO: 11.56 K/UL — HIGH (ref 3.8–10.5)

## 2023-10-07 PROCEDURE — 71045 X-RAY EXAM CHEST 1 VIEW: CPT | Mod: 26

## 2023-10-07 PROCEDURE — 99231 SBSQ HOSP IP/OBS SF/LOW 25: CPT

## 2023-10-07 RX ORDER — OXYCODONE HYDROCHLORIDE 5 MG/1
5 TABLET ORAL EVERY 8 HOURS
Refills: 0 | Status: DISCONTINUED | OUTPATIENT
Start: 2023-10-07 | End: 2023-10-14

## 2023-10-07 RX ORDER — SENNA PLUS 8.6 MG/1
2 TABLET ORAL AT BEDTIME
Refills: 0 | Status: DISCONTINUED | OUTPATIENT
Start: 2023-10-07 | End: 2023-10-14

## 2023-10-07 RX ADMIN — Medication 650 MILLIGRAM(S): at 06:44

## 2023-10-07 RX ADMIN — Medication 650 MILLIGRAM(S): at 03:53

## 2023-10-07 RX ADMIN — PANTOPRAZOLE SODIUM 40 MILLIGRAM(S): 20 TABLET, DELAYED RELEASE ORAL at 06:44

## 2023-10-07 RX ADMIN — SODIUM CHLORIDE 3 MILLILITER(S): 9 INJECTION INTRAMUSCULAR; INTRAVENOUS; SUBCUTANEOUS at 21:24

## 2023-10-07 RX ADMIN — Medication 650 MILLIGRAM(S): at 00:06

## 2023-10-07 RX ADMIN — Medication 1 MILLIGRAM(S): at 06:44

## 2023-10-07 RX ADMIN — SODIUM CHLORIDE 3 MILLILITER(S): 9 INJECTION INTRAMUSCULAR; INTRAVENOUS; SUBCUTANEOUS at 13:16

## 2023-10-07 RX ADMIN — Medication 650 MILLIGRAM(S): at 13:23

## 2023-10-07 RX ADMIN — HEPARIN SODIUM 5000 UNIT(S): 5000 INJECTION INTRAVENOUS; SUBCUTANEOUS at 06:45

## 2023-10-07 RX ADMIN — Medication 650 MILLIGRAM(S): at 14:13

## 2023-10-07 RX ADMIN — SODIUM CHLORIDE 3 MILLILITER(S): 9 INJECTION INTRAMUSCULAR; INTRAVENOUS; SUBCUTANEOUS at 05:54

## 2023-10-07 RX ADMIN — Medication 650 MILLIGRAM(S): at 08:19

## 2023-10-07 RX ADMIN — HEPARIN SODIUM 5000 UNIT(S): 5000 INJECTION INTRAVENOUS; SUBCUTANEOUS at 13:23

## 2023-10-07 RX ADMIN — HEPARIN SODIUM 5000 UNIT(S): 5000 INJECTION INTRAVENOUS; SUBCUTANEOUS at 22:42

## 2023-10-07 NOTE — PROGRESS NOTE ADULT - SUBJECTIVE AND OBJECTIVE BOX
Patient discussed on morning rounds with Dr. Good      OPERATION & DATE:   9/15 s/p L VATS YARITZA and LLL wedge resection   10/6 readmit w/ large left sided pneumothorax     SUBJECTIVE ASSESSMENT: Seen sitting bedside. Reports pain at chest tube site. Denies SOB/chest pain.     VITAL SIGNS:  Vital Signs Last 24 Hrs  T(C): 36.4 (07 Oct 2023 09:18), Max: 36.9 (06 Oct 2023 19:00)  T(F): 97.5 (07 Oct 2023 09:18), Max: 98.5 (06 Oct 2023 19:00)  HR: 66 (07 Oct 2023 09:00) (56 - 110)  BP: 102/60 (07 Oct 2023 09:00) (101/66 - 127/77)  BP(mean): 74 (07 Oct 2023 09:00) (74 - 90)  RR: 18 (07 Oct 2023 09:00) (16 - 22)  SpO2: 98% (07 Oct 2023 09:00) (96% - 100%)    Parameters below as of 07 Oct 2023 09:00  Patient On (Oxygen Delivery Method): nasal cannula w/ humidification  O2 Flow (L/min): 3    I&O's Detail    06 Oct 2023 07:01  -  07 Oct 2023 07:00  --------------------------------------------------------  IN:  Total IN: 0 mL    OUT:    Chest Tube (mL): 5 mL  Total OUT: 5 mL    Total NET: -5 mL    CHEST TUBE: y, to water seal this AM    MISHA DRAIN: n   EPICARDIAL WIRES: n  STITCHES:n  STAPLES:n  JOEL: n  CENTRAL LINE:n  MIDLINE/PICC:n  WOUND VAC:n    PHYSICAL EXAM:  General: NAD, sitting comfortably in chair, conversing appropriately  Neurological: alert and oriented, UE and LE strength equal b/l, facial symmetry present  Cardiovascular: RRR, Clear S1 and S2, no murmurs appreciated  Respiratory: chest expansion symmetrical, CTA b/l, no wheezing noted  Gastrointestinal: +BS, soft, NT, ND  Extremities: moving spontaneously, no calf tenderness or edema.  Vascular: warm, well perfused. DP/PT pulses palpable b/l.  Incisions: CT site C/D/I     LABS:                        15.7   13.02 )-----------( 263      ( 06 Oct 2023 16:11 )             46.1     PT/INR - ( 06 Oct 2023 16:11 )   PT: 10.0 sec;   INR: 0.87          PTT - ( 06 Oct 2023 16:11 )  PTT:31.3 sec  10-06    142  |  101  |  17  ----------------------------<  94  3.1<L>   |  29  |  0.82    Ca    9.7      06 Oct 2023 16:11      Urinalysis Basic - ( 06 Oct 2023 16:11 )    Color: x / Appearance: x / SG: x / pH: x  Gluc: 94 mg/dL / Ketone: x  / Bili: x / Urobili: x   Blood: x / Protein: x / Nitrite: x   Leuk Esterase: x / RBC: x / WBC x   Sq Epi: x / Non Sq Epi: x / Bacteria: x    MEDICATIONS  (STANDING):  heparin   Injectable 5000 Unit(s) SubCutaneous every 8 hours  influenza   Vaccine 0.5 milliLiter(s) IntraMuscular once  pantoprazole    Tablet 40 milliGRAM(s) Oral before breakfast  polyethylene glycol 3350 17 Gram(s) Oral daily  potassium chloride   Powder 40 milliEquivalent(s) Oral once  predniSONE   Tablet 1 milliGRAM(s) Oral daily  sodium chloride 0.9% lock flush 3 milliLiter(s) IV Push every 8 hours    MEDICATIONS  (PRN):  acetaminophen     Tablet .. 650 milliGRAM(s) Oral every 6 hours PRN Moderate Pain (4 - 6)    RADIOLOGY & ADDITIONAL TESTS:     Patient discussed on morning rounds with Dr. Good      OPERATION & DATE:   9/15 s/p L VATS YARITZA and LLL wedge resection   10/6 readmit w/ large left sided pneumothorax     SUBJECTIVE ASSESSMENT: Seen sitting bedside. Reports pain at chest tube site. Denies SOB/chest pain.     VITAL SIGNS:  Vital Signs Last 24 Hrs  T(C): 36.4 (07 Oct 2023 09:18), Max: 36.9 (06 Oct 2023 19:00)  T(F): 97.5 (07 Oct 2023 09:18), Max: 98.5 (06 Oct 2023 19:00)  HR: 66 (07 Oct 2023 09:00) (56 - 110)  BP: 102/60 (07 Oct 2023 09:00) (101/66 - 127/77)  BP(mean): 74 (07 Oct 2023 09:00) (74 - 90)  RR: 18 (07 Oct 2023 09:00) (16 - 22)  SpO2: 98% (07 Oct 2023 09:00) (96% - 100%)    Parameters below as of 07 Oct 2023 09:00  Patient On (Oxygen Delivery Method): nasal cannula w/ humidification  O2 Flow (L/min): 3    I&O's Detail    06 Oct 2023 07:01  -  07 Oct 2023 07:00  --------------------------------------------------------  IN:  Total IN: 0 mL    OUT:    Chest Tube (mL): 5 mL  Total OUT: 5 mL    Total NET: -5 mL    CHEST TUBE: y, to water seal this AM    MISHA DRAIN: n   EPICARDIAL WIRES: n  STITCHES:n  STAPLES:n  JOEL: n  CENTRAL LINE:n  MIDLINE/PICC:n  WOUND VAC:n    PHYSICAL EXAM:  General: NAD, sitting comfortably in chair, conversing appropriately  Neurological: alert and oriented, UE and LE strength equal b/l, facial symmetry present  Cardiovascular: RRR, Clear S1 and S2, no murmurs appreciated  Respiratory: chest expansion symmetrical, CTA b/l, no wheezing noted  Gastrointestinal: +BS, soft, NT, ND  Extremities: moving spontaneously, no calf tenderness or edema.  Vascular: warm, well perfused. DP/PT pulses palpable b/l.  Incisions: CT site C/D/I     LABS:                        15.7   13.02 )-----------( 263      ( 06 Oct 2023 16:11 )             46.1     PT/INR - ( 06 Oct 2023 16:11 )   PT: 10.0 sec;   INR: 0.87          PTT - ( 06 Oct 2023 16:11 )  PTT:31.3 sec  10-06    142  |  101  |  17  ----------------------------<  94  3.1<L>   |  29  |  0.82    Ca    9.7      06 Oct 2023 16:11    Urinalysis Basic - ( 06 Oct 2023 16:11 )    Color: x / Appearance: x / SG: x / pH: x  Gluc: 94 mg/dL / Ketone: x  / Bili: x / Urobili: x   Blood: x / Protein: x / Nitrite: x   Leuk Esterase: x / RBC: x / WBC x   Sq Epi: x / Non Sq Epi: x / Bacteria: x    MEDICATIONS  (STANDING):  heparin   Injectable 5000 Unit(s) SubCutaneous every 8 hours  influenza   Vaccine 0.5 milliLiter(s) IntraMuscular once  pantoprazole    Tablet 40 milliGRAM(s) Oral before breakfast  polyethylene glycol 3350 17 Gram(s) Oral daily  potassium chloride   Powder 40 milliEquivalent(s) Oral once  predniSONE   Tablet 1 milliGRAM(s) Oral daily  sodium chloride 0.9% lock flush 3 milliLiter(s) IV Push every 8 hours    MEDICATIONS  (PRN):  acetaminophen     Tablet .. 650 milliGRAM(s) Oral every 6 hours PRN Moderate Pain (4 - 6)    RADIOLOGY & ADDITIONAL TESTS:

## 2023-10-07 NOTE — PROGRESS NOTE ADULT - ASSESSMENT
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 Sputum AFB smear is negativex3, culture negative x 4 weeks. CT scan reveals YARITZA and LLL interstitial lung disease. 9/15 pt underwent L VATS YARITZA and LLL wedge resection with Dr. Good. OR course uneventful and recovered post op without difficulty. Pt reports feeling some new L sided CP last week with some associated crackling. At his routine f/u appointment this morning, CXR revealed large PTX. Left sided pigtail placed.     10/7 lung up on AM Xray. CT to water seal.     Plan:    Neurovascular:   -Pain moderately controlled   -PRN's: tylenol, oxy5    Cardiovascular:   -Hemodynamically stable.   -Monitor: BP, HR, tele    Respiratory:   9/15 s/p pt underwent L VATS YARITZA and LLL wedge resection with Dr. Good  L sided PNX, readmit yesterday from office   -Oxygenating well on room air  -Encourage continued use of IS 10x/hr and frequent ambulation  -CXR:    GI:  -GI PPX: protonix   -PO Diet  -Bowel Regimen: miralax     Renal / :  -Continue to monitor renal function: BUN/Cr  -Monitor I/O's daily     Endocrine:    -No hx of DM or thyroid dx    Hematologic:  -CBC: H/H-  -Coagulation Panel.    ID:  -Temperature:   -CBC: WBC-  -Continue to observe for SIRS/Sepsis Syndrome.    Prophylaxis:  -DVT prophylaxis with 5000 SubQ Heparin q8h.  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 Sputum AFB smear is negativex3, culture negative x 4 weeks. CT scan reveals YARITZA and LLL interstitial lung disease. 9/15 pt underwent L VATS YARITZA and LLL wedge resection with Dr. Good. OR course uneventful and recovered post op without difficulty. Pt reports feeling some new L sided CP last week with some associated crackling. At his routine f/u appointment 10/6, CXR revealed large PTX and patient was sent to ER for further management. Left sided pigtail placed.     10/7 lung up on AM Xray. CT to water seal.     Plan:    Neurovascular:   -Pain moderately controlled   -PRN's: tylenol, oxy5    Cardiovascular:   -Hemodynamically stable.   -Monitor: BP, HR, tele    Respiratory:   S/p L VATS YARITZA and LLL wedge resection 9/15  -  now readmitted with Large left sided PNX  - 1 CT in place, to water seal this AM, f/up CXR stable   -Oxygenating well on room air  -Encourage continued use of IS 10x/hr and frequent ambulation    GI:  -GI PPX: protonix   -PO Diet  -Bowel Regimen: miralax     Renal / :  -Continue to monitor renal function: BUN/Cr: 0.82  -Monitor I/O's daily     Endocrine:    -No hx of DM or thyroid dx    Hematologic:  -no overt s/s of active bleeding   -CBC: H/H-15.1/45.8  -Coagulation Panel.    ID:  -Temperature: afebrile   -CBC: WBC-11.56   -Continue to observe for SIRS/Sepsis Syndrome.    Prophylaxis:  -DVT prophylaxis with 5000 SubQ Heparin q8h.  -Continue with SCD's b/l while patient is at rest     Disposition:  CT to water seal this AM, f/up CXR stable   Likely home tomorrow   Continue pain control

## 2023-10-08 PROCEDURE — 99231 SBSQ HOSP IP/OBS SF/LOW 25: CPT

## 2023-10-08 PROCEDURE — 71045 X-RAY EXAM CHEST 1 VIEW: CPT | Mod: 26,77

## 2023-10-08 PROCEDURE — 71045 X-RAY EXAM CHEST 1 VIEW: CPT | Mod: 26

## 2023-10-08 RX ADMIN — HEPARIN SODIUM 5000 UNIT(S): 5000 INJECTION INTRAVENOUS; SUBCUTANEOUS at 05:43

## 2023-10-08 RX ADMIN — OXYCODONE HYDROCHLORIDE 5 MILLIGRAM(S): 5 TABLET ORAL at 07:45

## 2023-10-08 RX ADMIN — Medication 1 MILLIGRAM(S): at 06:12

## 2023-10-08 RX ADMIN — SENNA PLUS 2 TABLET(S): 8.6 TABLET ORAL at 21:22

## 2023-10-08 RX ADMIN — POLYETHYLENE GLYCOL 3350 17 GRAM(S): 17 POWDER, FOR SOLUTION ORAL at 11:13

## 2023-10-08 RX ADMIN — HEPARIN SODIUM 5000 UNIT(S): 5000 INJECTION INTRAVENOUS; SUBCUTANEOUS at 13:46

## 2023-10-08 RX ADMIN — SODIUM CHLORIDE 3 MILLILITER(S): 9 INJECTION INTRAMUSCULAR; INTRAVENOUS; SUBCUTANEOUS at 13:44

## 2023-10-08 RX ADMIN — PANTOPRAZOLE SODIUM 40 MILLIGRAM(S): 20 TABLET, DELAYED RELEASE ORAL at 06:14

## 2023-10-08 RX ADMIN — HEPARIN SODIUM 5000 UNIT(S): 5000 INJECTION INTRAVENOUS; SUBCUTANEOUS at 21:21

## 2023-10-08 RX ADMIN — SODIUM CHLORIDE 3 MILLILITER(S): 9 INJECTION INTRAMUSCULAR; INTRAVENOUS; SUBCUTANEOUS at 21:05

## 2023-10-08 RX ADMIN — SODIUM CHLORIDE 3 MILLILITER(S): 9 INJECTION INTRAMUSCULAR; INTRAVENOUS; SUBCUTANEOUS at 05:44

## 2023-10-08 RX ADMIN — OXYCODONE HYDROCHLORIDE 5 MILLIGRAM(S): 5 TABLET ORAL at 08:35

## 2023-10-08 NOTE — PROGRESS NOTE ADULT - SUBJECTIVE AND OBJECTIVE BOX
Patient discussed on morning rounds with Dr. Good      OPERATION & DATE:  9/15 s/p L VATS YARITZA and LLL wedge resection   10/6 readmit w/ large left sided pneumothorax     SUBJECTIVE ASSESSMENT: Seen sitting bedside in NAD. Daughter present. Denies current chest pain or SOB. No significant events reported overnight.     VITAL SIGNS:  Vital Signs Last 24 Hrs  T(C): 36.5 (08 Oct 2023 09:01), Max: 36.7 (07 Oct 2023 12:57)  T(F): 97.7 (08 Oct 2023 09:01), Max: 98 (07 Oct 2023 12:57)  HR: 94 (08 Oct 2023 10:41) (57 - 94)  BP: 96/70 (08 Oct 2023 10:41) (94/68 - 111/77)  BP(mean): 79 (08 Oct 2023 10:41) (77 - 90)  RR: 18 (08 Oct 2023 10:41) (18 - 18)  SpO2: 95% (08 Oct 2023 10:41) (94% - 100%)    Parameters below as of 08 Oct 2023 10:41  Patient On (Oxygen Delivery Method): room air    I&O's Detail    07 Oct 2023 07:01  -  08 Oct 2023 07:00  --------------------------------------------------------  IN:    Oral Fluid: 550 mL  Total IN: 550 mL    OUT:    Chest Tube (mL): 65 mL    Voided (mL): 1100 mL  Total OUT: 1165 mL    Total NET: -615 mL    08 Oct 2023 07:01  -  08 Oct 2023 11:26  --------------------------------------------------------  IN:  Total IN: 0 mL    OUT:    Chest Tube (mL): 0 mL  Total OUT: 0 mL    Total NET: 0 mL    CHEST TUBE: y, to suction    MISHA DRAIN: n   EPICARDIAL WIRES: n  STITCHES:n  STAPLES:n  JOEL: n  CENTRAL LINE:n  MIDLINE/PICC:n  WOUND VAC:n    PHYSICAL EXAM:  General: NAD, sitting comfortably in chair, conversing appropriately  Neurological: alert and oriented, UE and LE strength equal b/l, facial symmetry present  Cardiovascular: RRR, Clear S1 and S2, no murmurs appreciated  Respiratory: chest expansion symmetrical, CTA b/l, no wheezing noted  Gastrointestinal: +BS, soft, NT, ND  Extremities: moving spontaneously, no calf tenderness or edema.  Vascular: warm, well perfused. DP/PT pulses palpable b/l.  Incisions: CT site C/D/I     LABS:                        15.1   11.56 )-----------( 219      ( 07 Oct 2023 09:46 )             45.8     PT/INR - ( 06 Oct 2023 16:11 )   PT: 10.0 sec;   INR: 0.87          PTT - ( 06 Oct 2023 16:11 )  PTT:31.3 sec  10-07    139  |  101  |  19  ----------------------------<  108<H>  3.9   |  25  |  0.74    Ca    9.6      07 Oct 2023 09:46  Mg     2.2     10-07    Urinalysis Basic - ( 07 Oct 2023 09:46 )    Color: x / Appearance: x / SG: x / pH: x  Gluc: 108 mg/dL / Ketone: x  / Bili: x / Urobili: x   Blood: x / Protein: x / Nitrite: x   Leuk Esterase: x / RBC: x / WBC x   Sq Epi: x / Non Sq Epi: x / Bacteria: x    MEDICATIONS  (STANDING):  heparin   Injectable 5000 Unit(s) SubCutaneous every 8 hours  influenza   Vaccine 0.5 milliLiter(s) IntraMuscular once  pantoprazole    Tablet 40 milliGRAM(s) Oral before breakfast  polyethylene glycol 3350 17 Gram(s) Oral daily  potassium chloride   Powder 40 milliEquivalent(s) Oral once  predniSONE   Tablet 1 milliGRAM(s) Oral daily  senna 2 Tablet(s) Oral at bedtime  sodium chloride 0.9% lock flush 3 milliLiter(s) IV Push every 8 hours    MEDICATIONS  (PRN):  acetaminophen Tablet .. 650 milliGRAM(s) Oral every 6 hours PRN Moderate Pain (4 - 6)  oxyCODONE  IR 5 milliGRAM(s) Oral every 8 hours PRN Moderate Pain (4 - 6)    RADIOLOGY & ADDITIONAL TESTS:     Patient discussed on morning rounds with Dr. Good      OPERATION & DATE:  9/15 s/p L VATS YARITZA and LLL wedge resection   10/6 readmit w/ large left sided pneumothorax     SUBJECTIVE ASSESSMENT: Seen sitting bedside in NAD. Daughter present. Denies current chest pain or SOB. No significant events reported overnight.     VITAL SIGNS:  Vital Signs Last 24 Hrs  T(C): 36.5 (08 Oct 2023 09:01), Max: 36.7 (07 Oct 2023 12:57)  T(F): 97.7 (08 Oct 2023 09:01), Max: 98 (07 Oct 2023 12:57)  HR: 94 (08 Oct 2023 10:41) (57 - 94)  BP: 96/70 (08 Oct 2023 10:41) (94/68 - 111/77)  BP(mean): 79 (08 Oct 2023 10:41) (77 - 90)  RR: 18 (08 Oct 2023 10:41) (18 - 18)  SpO2: 95% (08 Oct 2023 10:41) (94% - 100%)    Parameters below as of 08 Oct 2023 10:41  Patient On (Oxygen Delivery Method): room air    I&O's Detail    07 Oct 2023 07:01  -  08 Oct 2023 07:00  --------------------------------------------------------  IN:    Oral Fluid: 550 mL  Total IN: 550 mL    OUT:    Chest Tube (mL): 65 mL    Voided (mL): 1100 mL  Total OUT: 1165 mL    Total NET: -615 mL    08 Oct 2023 07:01  -  08 Oct 2023 11:26  --------------------------------------------------------  IN:  Total IN: 0 mL    OUT:    Chest Tube (mL): 0 mL  Total OUT: 0 mL    Total NET: 0 mL    CHEST TUBE: y, to suction    MISHA DRAIN: n   EPICARDIAL WIRES: n  STITCHES:n  STAPLES:n  JOEL: n  CENTRAL LINE:n  MIDLINE/PICC:n  WOUND VAC:n    PHYSICAL EXAM:  General: NAD, sitting comfortably in chair, conversing appropriately  Neurological: alert and oriented, UE and LE strength equal b/l, facial symmetry present  Cardiovascular: RRR, Clear S1 and S2, no murmurs appreciated  Respiratory: chest expansion symmetrical, CTA b/l, no wheezing noted  Gastrointestinal: +BS, soft, NT, ND  Extremities: moving spontaneously, no calf tenderness or edema.  Vascular: warm, well perfused. DP/PT pulses palpable b/l.  Incisions: CT site C/D/I     LABS:                        15.1   11.56 )-----------( 219      ( 07 Oct 2023 09:46 )             45.8     PT/INR - ( 06 Oct 2023 16:11 )   PT: 10.0 sec;   INR: 0.87          PTT - ( 06 Oct 2023 16:11 )  PTT:31.3 sec  10-07    139  |  101  |  19  ----------------------------<  108<H>  3.9   |  25  |  0.74    Ca    9.6      07 Oct 2023 09:46  Mg     2.2     10-07    Urinalysis Basic - ( 07 Oct 2023 09:46 )    Color: x / Appearance: x / SG: x / pH: x  Gluc: 108 mg/dL / Ketone: x  / Bili: x / Urobili: x   Blood: x / Protein: x / Nitrite: x   Leuk Esterase: x / RBC: x / WBC x   Sq Epi: x / Non Sq Epi: x / Bacteria: x    MEDICATIONS  (STANDING):  heparin   Injectable 5000 Unit(s) SubCutaneous every 8 hours  influenza   Vaccine 0.5 milliLiter(s) IntraMuscular once  pantoprazole    Tablet 40 milliGRAM(s) Oral before breakfast  polyethylene glycol 3350 17 Gram(s) Oral daily  potassium chloride   Powder 40 milliEquivalent(s) Oral once  predniSONE   Tablet 1 milliGRAM(s) Oral daily  senna 2 Tablet(s) Oral at bedtime  sodium chloride 0.9% lock flush 3 milliLiter(s) IV Push every 8 hours    MEDICATIONS  (PRN):  acetaminophen Tablet .. 650 milliGRAM(s) Oral every 6 hours PRN Moderate Pain (4 - 6)  oxyCODONE  IR 5 milliGRAM(s) Oral every 8 hours PRN Moderate Pain (4 - 6)    RADIOLOGY & ADDITIONAL TESTS:    CXR:   Stable left-sided pneumothorax with pigtail chest tube again seen. Remainder of exam, including air-filled viscera under an elevated left hemidiaphragm, is similar to prior.

## 2023-10-08 NOTE — PROGRESS NOTE ADULT - ASSESSMENT
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 Sputum AFB smear is negativex3, culture negative x 4 weeks. CT scan reveals YARITZA and LLL interstitial lung disease. 9/15 pt underwent L VATS YARITZA and LLL wedge resection with Dr. Good. OR course uneventful and recovered post op without difficulty. Pt reports feeling some new L sided CP last week with some associated crackling. At his routine f/u appointment 10/6, CXR revealed large PTX and patient was sent to ER for further management. Left sided pigtail placed.     10/7 lung up on AM Xray. CT to water seal.   10/8 increased pneumo on morning CXR, placed back to suction at -40 per Dr. Good. Plan for pleurodesis tomorrow.      Plan:    Neurovascular:   -Pain moderately controlled   -PRN's: tylenol, oxy5    Cardiovascular:   -Hemodynamically stable.   -Monitor: BP, HR, tele    Respiratory:   S/p L VATS YARITZA and LLL wedge resection 9/15  -  now readmitted with Large left sided PNX  - 1 CT in place, placed back to suction this AM for increased air space, likely pleurodesis tomorrow   -Oxygenating well on room air  -Encourage continued use of IS 10x/hr and frequent ambulation    GI:  -GI PPX: protonix   -PO Diet  -Bowel Regimen: miralax, senna     Renal / :  -Continue to monitor renal function: BUN/Cr: 0.74/19  -Monitor I/O's daily     Endocrine:    -No hx of DM or thyroid dx    Hematologic:  -no overt s/s of active bleeding   -CBC: H/H-15.1/45.8  -Coagulation Panel.    ID:  -Temperature: afebrile   -CBC: WBC-11.56   -Continue to observe for SIRS/Sepsis Syndrome.    Prophylaxis:  -DVT prophylaxis with 5000 SubQ Heparin q8h.  -Continue with SCD's b/l while patient is at rest     Disposition:  CXR this AM w/ increasing PNX, placed back to suction per Dr. 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 Sputum AFB smear is negativex3, culture negative x 4 weeks. CT scan reveals YARITZA and LLL interstitial lung disease. 9/15 pt underwent L VATS YARITZA and LLL wedge resection with Dr. Good. OR course uneventful and recovered post op without difficulty. Pt reports feeling some new L sided CP last week with some associated crackling. At his routine f/u appointment 10/6, CXR revealed large PTX and patient was sent to ER for further management. Left sided pigtail placed. 10/7 lung up on AM Xray. CT to water seal. 10/8 increased pneumo on morning CXR, placed back to suction at -40 per Dr. Good. Plan for pleurodesis tomorrow.      Plan:    Neurovascular:   -Pain moderately controlled   -PRN's: tylenol, oxy5    Cardiovascular:   -Hemodynamically stable.   -Monitor: BP, HR, tele    Respiratory:   S/p L VATS YARITZA and LLL wedge resection 9/15  -  now readmitted with Large left sided PNX  - 1 CT in place, placed back to suction this AM for increased air space, likely pleurodesis tomorrow   -Oxygenating well on room air  -Encourage continued use of IS 10x/hr and frequent ambulation    GI:  -GI PPX: protonix   -PO Diet  -Bowel Regimen: miralax, senna     Renal / :  -Continue to monitor renal function: BUN/Cr: 0.74/19  -Monitor I/O's daily     Endocrine:    -No hx of DM or thyroid dx    Hematologic:  -no overt s/s of active bleeding   -CBC: H/H-15.1/45.8  -Coagulation Panel.    ID:  -Temperature: afebrile   -CBC: WBC-11.56   -Continue to observe for SIRS/Sepsis Syndrome.    Prophylaxis:  -DVT prophylaxis with 5000 SubQ Heparin q8h.  -Continue with SCD's b/l while patient is at rest     Disposition:  CXR this AM w/ increasing PNX, CT placed back to suction per Dr. Good at -40   Likely will need pleurodesis tomorrow

## 2023-10-09 LAB
ANION GAP SERPL CALC-SCNC: 10 MMOL/L — SIGNIFICANT CHANGE UP (ref 5–17)
BUN SERPL-MCNC: 17 MG/DL — SIGNIFICANT CHANGE UP (ref 7–23)
CALCIUM SERPL-MCNC: 9.2 MG/DL — SIGNIFICANT CHANGE UP (ref 8.4–10.5)
CHLORIDE SERPL-SCNC: 107 MMOL/L — SIGNIFICANT CHANGE UP (ref 96–108)
CO2 SERPL-SCNC: 27 MMOL/L — SIGNIFICANT CHANGE UP (ref 22–31)
CREAT SERPL-MCNC: 1.05 MG/DL — SIGNIFICANT CHANGE UP (ref 0.5–1.3)
EGFR: 85 ML/MIN/1.73M2 — SIGNIFICANT CHANGE UP
GLUCOSE SERPL-MCNC: 108 MG/DL — HIGH (ref 70–99)
HCT VFR BLD CALC: 40.6 % — SIGNIFICANT CHANGE UP (ref 39–50)
HGB BLD-MCNC: 13.7 G/DL — SIGNIFICANT CHANGE UP (ref 13–17)
MAGNESIUM SERPL-MCNC: 2 MG/DL — SIGNIFICANT CHANGE UP (ref 1.6–2.6)
MCHC RBC-ENTMCNC: 31.6 PG — SIGNIFICANT CHANGE UP (ref 27–34)
MCHC RBC-ENTMCNC: 33.7 GM/DL — SIGNIFICANT CHANGE UP (ref 32–36)
MCV RBC AUTO: 93.5 FL — SIGNIFICANT CHANGE UP (ref 80–100)
NRBC # BLD: 0 /100 WBCS — SIGNIFICANT CHANGE UP (ref 0–0)
PLATELET # BLD AUTO: 184 K/UL — SIGNIFICANT CHANGE UP (ref 150–400)
POTASSIUM SERPL-MCNC: 3.5 MMOL/L — SIGNIFICANT CHANGE UP (ref 3.5–5.3)
POTASSIUM SERPL-SCNC: 3.5 MMOL/L — SIGNIFICANT CHANGE UP (ref 3.5–5.3)
RBC # BLD: 4.34 M/UL — SIGNIFICANT CHANGE UP (ref 4.2–5.8)
RBC # FLD: 12.1 % — SIGNIFICANT CHANGE UP (ref 10.3–14.5)
SODIUM SERPL-SCNC: 144 MMOL/L — SIGNIFICANT CHANGE UP (ref 135–145)
WBC # BLD: 7.31 K/UL — SIGNIFICANT CHANGE UP (ref 3.8–10.5)
WBC # FLD AUTO: 7.31 K/UL — SIGNIFICANT CHANGE UP (ref 3.8–10.5)

## 2023-10-09 PROCEDURE — 71045 X-RAY EXAM CHEST 1 VIEW: CPT | Mod: 26

## 2023-10-09 PROCEDURE — 71045 X-RAY EXAM CHEST 1 VIEW: CPT | Mod: 26,77

## 2023-10-09 RX ORDER — POTASSIUM CHLORIDE 20 MEQ
40 PACKET (EA) ORAL
Refills: 0 | Status: COMPLETED | OUTPATIENT
Start: 2023-10-09 | End: 2023-10-09

## 2023-10-09 RX ORDER — MAGNESIUM OXIDE 400 MG ORAL TABLET 241.3 MG
800 TABLET ORAL ONCE
Refills: 0 | Status: COMPLETED | OUTPATIENT
Start: 2023-10-09 | End: 2023-10-09

## 2023-10-09 RX ADMIN — PANTOPRAZOLE SODIUM 40 MILLIGRAM(S): 20 TABLET, DELAYED RELEASE ORAL at 07:05

## 2023-10-09 RX ADMIN — Medication 650 MILLIGRAM(S): at 17:46

## 2023-10-09 RX ADMIN — HEPARIN SODIUM 5000 UNIT(S): 5000 INJECTION INTRAVENOUS; SUBCUTANEOUS at 21:37

## 2023-10-09 RX ADMIN — HEPARIN SODIUM 5000 UNIT(S): 5000 INJECTION INTRAVENOUS; SUBCUTANEOUS at 13:06

## 2023-10-09 RX ADMIN — MAGNESIUM OXIDE 400 MG ORAL TABLET 800 MILLIGRAM(S): 241.3 TABLET ORAL at 08:31

## 2023-10-09 RX ADMIN — POLYETHYLENE GLYCOL 3350 17 GRAM(S): 17 POWDER, FOR SOLUTION ORAL at 11:24

## 2023-10-09 RX ADMIN — HEPARIN SODIUM 5000 UNIT(S): 5000 INJECTION INTRAVENOUS; SUBCUTANEOUS at 05:44

## 2023-10-09 RX ADMIN — Medication 650 MILLIGRAM(S): at 17:16

## 2023-10-09 RX ADMIN — SODIUM CHLORIDE 3 MILLILITER(S): 9 INJECTION INTRAMUSCULAR; INTRAVENOUS; SUBCUTANEOUS at 21:34

## 2023-10-09 RX ADMIN — SODIUM CHLORIDE 3 MILLILITER(S): 9 INJECTION INTRAMUSCULAR; INTRAVENOUS; SUBCUTANEOUS at 05:37

## 2023-10-09 RX ADMIN — Medication 1 MILLIGRAM(S): at 05:44

## 2023-10-09 RX ADMIN — SENNA PLUS 2 TABLET(S): 8.6 TABLET ORAL at 21:37

## 2023-10-09 RX ADMIN — Medication 40 MILLIEQUIVALENT(S): at 11:23

## 2023-10-09 RX ADMIN — SODIUM CHLORIDE 3 MILLILITER(S): 9 INJECTION INTRAMUSCULAR; INTRAVENOUS; SUBCUTANEOUS at 13:00

## 2023-10-09 RX ADMIN — Medication 40 MILLIEQUIVALENT(S): at 08:31

## 2023-10-09 NOTE — PROGRESS NOTE ADULT - ASSESSMENT
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 Sputum AFB smear is negativex3, culture negative x 4 weeks. CT scan reveals YARITZA and LLL interstitial lung disease. 9/15/23 pt underwent L VATS YARITZA and LLL wedge resection with Dr. Good. OR course uneventful and recovered post op without difficulty. Pt reports feeling some new L sided CP last week with some associated crackling. At his routine f/u appointment 10/6/23, CXR revealed large PTX and patient was sent to ER for further management. Left sided pigtail placed. 10/7/23 lung remains up on AM Xray. CT placed to water seal. 10/8/23 increased pneumo on morning CXR, placed back to suction at -40 per Dr. Good.  Today, still has residual apical and lateral pneumothorax on morning Xray while tube remains on waterseal.  With this, patient is hemodynamically stable and denies SOB or pleuritic CP.     Plan:    Neurovascular:   -Pain meds: Tylenol and Oxycodone.    -Add Toradol ? if needed for breakthrough pain.     Cardiovascular:   -Hemodynamically stable.   -HR 60's NSR,  systolic.  No h/o heart dz.    Respiratory:   - 1 CT in place, on waterseal per thoracic team.  F/U plan with CT management.    -Oxygenating well on room air  -Encouraged more IS use and ambulation.     GI:  -GI PPX: protonix   -PO Diet  -Bowel Regimen: miralax, senna     Renal / :  -BUN/creatinine stable.     Endocrine:    -No hx of DM or thyroid dx    Hematologic:  -H/H stable  -No issues  -SC heparin     ID:  -No fevers, WBC normal.      Prophylaxis:  -DVT prophylaxis with 5000 SubQ Heparin q8h.  -Continue with SCD's     Disposition:  ?CT management.  Will discuss w/ Dr. Polo today.  So far keep tube as is.

## 2023-10-09 NOTE — PROGRESS NOTE ADULT - SUBJECTIVE AND OBJECTIVE BOX
Patient discussed on morning rounds with Dr. Polo     Operation / Date:   9/15/23 s/p L VATS YARITZA and LLL wedge resection   10/6/23 readmit w/ large left sided pneumothorax - pigtail placed    SUBJECTIVE ASSESSMENT:  Via translation by patients daughter who is at bedside.  Patient feels well today.  Has some pain at the chest tube site "whenever he moves".  Otherwise, walked in the hallway 2 times yesterday.  Has a good appetite.  Using his IS.  Denies CP/SOB/N/V/D/dizziness/cough/fever/chills.      Vital Signs Last 24 Hrs  T(C): 36.4 (09 Oct 2023 06:18), Max: 36.7 (08 Oct 2023 13:48)  T(F): 97.6 (09 Oct 2023 06:18), Max: 98.1 (08 Oct 2023 13:48)  HR: 92 (09 Oct 2023 08:24) (63 - 94)  BP: 100/72 (09 Oct 2023 08:24) (92/64 - 106/67)  BP(mean): 82 (09 Oct 2023 08:24) (74 - 82)  RR: 16 (09 Oct 2023 08:24) (12 - 18)  SpO2: 94% (09 Oct 2023 08:24) (94% - 96%)    Parameters below as of 09 Oct 2023 08:24  Patient On (Oxygen Delivery Method): room air      I&O's Detail    08 Oct 2023 07:01  -  09 Oct 2023 07:00  --------------------------------------------------------  IN:    Oral Fluid: 240 mL  Total IN: 240 mL    OUT:    Chest Tube (mL): 32 mL    Voided (mL): 200 mL  Total OUT: 232 mL    Total NET: 8 mL      CHEST TUBE:  Yes  (-40 suction) AIR LEAKS: No  MISHA DRAIN:  No  EPICARDIAL WIRES: No  TIE DOWNS: No  JOEL: No    PHYSICAL EXAM:    GEN: NAD, looks comfortable  Psych: Mood appropriate  Neuro: A&Ox3.  No focal deficits.  Moving all extremities.   HEENT: No obvious abnormalities  CV: S1S2, regular, no murmurs appreciated.  No carotid bruits.  No JVD  Lungs: Clear B/L.  No wheezing, rales or rhonchi  ABD: Soft, non-tender, non-distended.  +Bowel sounds  EXT: Warm and well perfused.  No peripheral edema noted  Musculoskeletal: Moving all extremities with normal ROM, no joint swelling  PV: Pedal pulses palpable  Incision Sites: Recent VATS incisions look clean, dry.      LABS:                        13.7   7.31  )-----------( 184      ( 09 Oct 2023 05:30 )             40.6       10-09    144  |  107  |  17  ----------------------------<  108<H>  3.5   |  27  |  1.05    Ca    9.2      09 Oct 2023 05:30  Mg     2.0     10-09        Urinalysis Basic - ( 09 Oct 2023 05:30 )    Color: x / Appearance: x / SG: x / pH: x  Gluc: 108 mg/dL / Ketone: x  / Bili: x / Urobili: x   Blood: x / Protein: x / Nitrite: x   Leuk Esterase: x / RBC: x / WBC x   Sq Epi: x / Non Sq Epi: x / Bacteria: x        MEDICATIONS  (STANDING):  heparin   Injectable 5000 Unit(s) SubCutaneous every 8 hours  influenza   Vaccine 0.5 milliLiter(s) IntraMuscular once  pantoprazole    Tablet 40 milliGRAM(s) Oral before breakfast  polyethylene glycol 3350 17 Gram(s) Oral daily  potassium chloride    Tablet ER 40 milliEquivalent(s) Oral every 2 hours  potassium chloride   Powder 40 milliEquivalent(s) Oral once  predniSONE   Tablet 1 milliGRAM(s) Oral daily  senna 2 Tablet(s) Oral at bedtime  sodium chloride 0.9% lock flush 3 milliLiter(s) IV Push every 8 hours    MEDICATIONS  (PRN):  acetaminophen     Tablet .. 650 milliGRAM(s) Oral every 6 hours PRN Moderate Pain (4 - 6)  oxyCODONE    IR 5 milliGRAM(s) Oral every 8 hours PRN Moderate Pain (4 - 6)        RADIOLOGY & ADDITIONAL TESTS:

## 2023-10-10 LAB
ANION GAP SERPL CALC-SCNC: 8 MMOL/L — SIGNIFICANT CHANGE UP (ref 5–17)
BUN SERPL-MCNC: 16 MG/DL — SIGNIFICANT CHANGE UP (ref 7–23)
CALCIUM SERPL-MCNC: 9.6 MG/DL — SIGNIFICANT CHANGE UP (ref 8.4–10.5)
CHLORIDE SERPL-SCNC: 106 MMOL/L — SIGNIFICANT CHANGE UP (ref 96–108)
CO2 SERPL-SCNC: 28 MMOL/L — SIGNIFICANT CHANGE UP (ref 22–31)
CREAT SERPL-MCNC: 0.9 MG/DL — SIGNIFICANT CHANGE UP (ref 0.5–1.3)
EGFR: 103 ML/MIN/1.73M2 — SIGNIFICANT CHANGE UP
GLUCOSE SERPL-MCNC: 99 MG/DL — SIGNIFICANT CHANGE UP (ref 70–99)
HCT VFR BLD CALC: 43.4 % — SIGNIFICANT CHANGE UP (ref 39–50)
HGB BLD-MCNC: 14.4 G/DL — SIGNIFICANT CHANGE UP (ref 13–17)
MAGNESIUM SERPL-MCNC: 2.3 MG/DL — SIGNIFICANT CHANGE UP (ref 1.6–2.6)
MCHC RBC-ENTMCNC: 31.6 PG — SIGNIFICANT CHANGE UP (ref 27–34)
MCHC RBC-ENTMCNC: 33.2 GM/DL — SIGNIFICANT CHANGE UP (ref 32–36)
MCV RBC AUTO: 95.2 FL — SIGNIFICANT CHANGE UP (ref 80–100)
NRBC # BLD: 0 /100 WBCS — SIGNIFICANT CHANGE UP (ref 0–0)
PLATELET # BLD AUTO: 164 K/UL — SIGNIFICANT CHANGE UP (ref 150–400)
POTASSIUM SERPL-MCNC: 4.3 MMOL/L — SIGNIFICANT CHANGE UP (ref 3.5–5.3)
POTASSIUM SERPL-SCNC: 4.3 MMOL/L — SIGNIFICANT CHANGE UP (ref 3.5–5.3)
RBC # BLD: 4.56 M/UL — SIGNIFICANT CHANGE UP (ref 4.2–5.8)
RBC # FLD: 12.3 % — SIGNIFICANT CHANGE UP (ref 10.3–14.5)
SODIUM SERPL-SCNC: 142 MMOL/L — SIGNIFICANT CHANGE UP (ref 135–145)
WBC # BLD: 6.46 K/UL — SIGNIFICANT CHANGE UP (ref 3.8–10.5)
WBC # FLD AUTO: 6.46 K/UL — SIGNIFICANT CHANGE UP (ref 3.8–10.5)

## 2023-10-10 PROCEDURE — 99152 MOD SED SAME PHYS/QHP 5/>YRS: CPT

## 2023-10-10 PROCEDURE — 99222 1ST HOSP IP/OBS MODERATE 55: CPT

## 2023-10-10 PROCEDURE — 32557 INSERT CATH PLEURA W/ IMAGE: CPT | Mod: LT

## 2023-10-10 PROCEDURE — 71045 X-RAY EXAM CHEST 1 VIEW: CPT | Mod: 26,77

## 2023-10-10 PROCEDURE — 71045 X-RAY EXAM CHEST 1 VIEW: CPT | Mod: 26,76

## 2023-10-10 RX ORDER — LIDOCAINE HCL 20 MG/ML
5 VIAL (ML) INJECTION ONCE
Refills: 0 | Status: COMPLETED | OUTPATIENT
Start: 2023-10-10 | End: 2023-10-10

## 2023-10-10 RX ORDER — FENTANYL CITRATE 50 UG/ML
25 INJECTION INTRAVENOUS ONCE
Refills: 0 | Status: DISCONTINUED | OUTPATIENT
Start: 2023-10-10 | End: 2023-10-10

## 2023-10-10 RX ORDER — ACETAMINOPHEN 500 MG
1000 TABLET ORAL ONCE
Refills: 0 | Status: COMPLETED | OUTPATIENT
Start: 2023-10-10 | End: 2023-10-10

## 2023-10-10 RX ORDER — HYDROMORPHONE HYDROCHLORIDE 2 MG/ML
0.5 INJECTION INTRAMUSCULAR; INTRAVENOUS; SUBCUTANEOUS ONCE
Refills: 0 | Status: DISCONTINUED | OUTPATIENT
Start: 2023-10-10 | End: 2023-10-10

## 2023-10-10 RX ORDER — LIDOCAINE HCL 20 MG/ML
20 VIAL (ML) INJECTION ONCE
Refills: 0 | Status: COMPLETED | OUTPATIENT
Start: 2023-10-10 | End: 2023-10-10

## 2023-10-10 RX ORDER — MORPHINE SULFATE 50 MG/1
4 CAPSULE, EXTENDED RELEASE ORAL ONCE
Refills: 0 | Status: DISCONTINUED | OUTPATIENT
Start: 2023-10-10 | End: 2023-10-10

## 2023-10-10 RX ADMIN — Medication 1 MILLIGRAM(S): at 06:59

## 2023-10-10 RX ADMIN — Medication 5 MILLILITER(S): at 14:10

## 2023-10-10 RX ADMIN — Medication 4000 MILLIGRAM(S): at 13:34

## 2023-10-10 RX ADMIN — OXYCODONE HYDROCHLORIDE 5 MILLIGRAM(S): 5 TABLET ORAL at 11:56

## 2023-10-10 RX ADMIN — OXYCODONE HYDROCHLORIDE 5 MILLIGRAM(S): 5 TABLET ORAL at 22:30

## 2023-10-10 RX ADMIN — HYDROMORPHONE HYDROCHLORIDE 0.5 MILLIGRAM(S): 2 INJECTION INTRAMUSCULAR; INTRAVENOUS; SUBCUTANEOUS at 13:15

## 2023-10-10 RX ADMIN — MORPHINE SULFATE 4 MILLIGRAM(S): 50 CAPSULE, EXTENDED RELEASE ORAL at 15:09

## 2023-10-10 RX ADMIN — FENTANYL CITRATE 25 MICROGRAM(S): 50 INJECTION INTRAVENOUS at 13:14

## 2023-10-10 RX ADMIN — FENTANYL CITRATE 25 MICROGRAM(S): 50 INJECTION INTRAVENOUS at 13:30

## 2023-10-10 RX ADMIN — Medication 500 MILLIGRAM(S): at 13:00

## 2023-10-10 RX ADMIN — POLYETHYLENE GLYCOL 3350 17 GRAM(S): 17 POWDER, FOR SOLUTION ORAL at 11:56

## 2023-10-10 RX ADMIN — SODIUM CHLORIDE 3 MILLILITER(S): 9 INJECTION INTRAMUSCULAR; INTRAVENOUS; SUBCUTANEOUS at 05:08

## 2023-10-10 RX ADMIN — HEPARIN SODIUM 5000 UNIT(S): 5000 INJECTION INTRAVENOUS; SUBCUTANEOUS at 06:59

## 2023-10-10 RX ADMIN — OXYCODONE HYDROCHLORIDE 5 MILLIGRAM(S): 5 TABLET ORAL at 12:45

## 2023-10-10 RX ADMIN — PANTOPRAZOLE SODIUM 40 MILLIGRAM(S): 20 TABLET, DELAYED RELEASE ORAL at 06:59

## 2023-10-10 RX ADMIN — HYDROMORPHONE HYDROCHLORIDE 0.5 MILLIGRAM(S): 2 INJECTION INTRAMUSCULAR; INTRAVENOUS; SUBCUTANEOUS at 12:58

## 2023-10-10 RX ADMIN — Medication 1000 MILLIGRAM(S): at 13:50

## 2023-10-10 RX ADMIN — HEPARIN SODIUM 5000 UNIT(S): 5000 INJECTION INTRAVENOUS; SUBCUTANEOUS at 15:08

## 2023-10-10 RX ADMIN — SODIUM CHLORIDE 3 MILLILITER(S): 9 INJECTION INTRAMUSCULAR; INTRAVENOUS; SUBCUTANEOUS at 13:00

## 2023-10-10 RX ADMIN — MORPHINE SULFATE 4 MILLIGRAM(S): 50 CAPSULE, EXTENDED RELEASE ORAL at 15:35

## 2023-10-10 NOTE — CONSULT NOTE ADULT - SUBJECTIVE AND OBJECTIVE BOX
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 Sputum AFB smear is negativex3, culture negative x 4 weeks. CT scan reveals YARITZA and LLL interstitial lung disease. 9/15/23 pt underwent L VATS YARITZA and LLL wedge resection with Dr. Good. OR course was uneventful and recovered post op without difficulty. Pt reports feeling some new L sided CP last week with some associated crackling. At his routine f/u appointment 10/6/23, CXR revealed large PTX and patient was sent to ER for further management. Left sided pigtail placed. 10/7/23 lung remains up on AM Xray. CT placed to water seal. 10/8/23 increased pneumo on morning CXR, placed back to suction at -40 per Dr. Good.  Today, still has residual apical and lateral pneumothorax on morning Xray while tube remains on water seal, no air leak. IR consulted for left apical chest tube placement.    Clinical History: PNEUMOTHORAX    No pertinent family history in first degree relatives    Handoff    MEWS Score    Chronic GERD    Chronic bronchitis, obstructive    Asthma    Pneumothorax    No significant past surgical history    MED EVAL    20    Room Service Assist    [email or web address]        Meds:acetaminophen     Tablet .. 650 milliGRAM(s) Oral every 6 hours PRN  heparin   Injectable 5000 Unit(s) SubCutaneous every 8 hours  influenza   Vaccine 0.5 milliLiter(s) IntraMuscular once  oxyCODONE    IR 5 milliGRAM(s) Oral every 8 hours PRN  pantoprazole    Tablet 40 milliGRAM(s) Oral before breakfast  polyethylene glycol 3350 17 Gram(s) Oral daily  potassium chloride   Powder 40 milliEquivalent(s) Oral once  predniSONE   Tablet 1 milliGRAM(s) Oral daily  senna 2 Tablet(s) Oral at bedtime  sodium chloride 0.9% lock flush 3 milliLiter(s) IV Push every 8 hours      Allergies:No Known Allergies        Labs:                           14.4   6.46  )-----------( 164      ( 10 Oct 2023 05:30 )             43.4       10-10    142  |  106  |  16  ----------------------------<  99  4.3   |  28  |  0.90    Ca    9.6      10 Oct 2023 05:30  Mg     2.3     10-10            Imaging Findings: reviewed, persistent left PTX  
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 Sputum AFB smear is negativex3, culture negative x 4 weeks. CT scan reveals YARITZA and LLL interstitial lung disease. 9/15/23 pt underwent L VATS YARITZA and LLL wedge resection with Dr. Good. OR course was uneventful and recovered post op without difficulty. 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 Pt reports feeling some new L sided CP last week with some associated crackling. At his routine f/u appointment 10/6/23, CXR revealed large PTX and patient was sent to ER for further management. Left sided pigtail placed. 10/7/23 lung remains up on AM Xray. CT placed to water seal. 10/8/23 increased pneumo on morning CXR, placed back to suction at -40 per Dr. Good.  Today, still has residual apical and lateral pneumothorax on morning Xray while tube remains on water seal, no air leak. IR consulted for left apical chest tube placement. and CTS plan on doxycycline pleurodesis as d/w pt, daughter and CTS team Dr. Polo this am.    SUBJECTIVE:  Patient was seen and examined at bedside.    Review of systems: No fever, chills, dizziness, HA, Changes in vision, CP, dyspnea, nausea or vomiting, dysuria, changes in bowel movements, LE edema. Rest of 12 point Review of systems negative unless otherwise documented elsewhere in note.     Diet, NPO after Midnight:      NPO Start Date: 09-Oct-2023,   NPO Start Time: 23:59  Except Medications (10-09-23 @ 16:36) [Active]  Diet, Regular (10-07-23 @ 07:57) [Active]      MEDICATIONS:  MEDICATIONS  (STANDING):  heparin   Injectable 5000 Unit(s) SubCutaneous every 8 hours  influenza   Vaccine 0.5 milliLiter(s) IntraMuscular once  lidocaine 1% Injectable 20 milliLiter(s) Local Injection once  lidocaine 2% Injectable 5 milliLiter(s) Local Injection once  pantoprazole    Tablet 40 milliGRAM(s) Oral before breakfast  polyethylene glycol 3350 17 Gram(s) Oral daily  potassium chloride   Powder 40 milliEquivalent(s) Oral once  predniSONE   Tablet 1 milliGRAM(s) Oral daily  senna 2 Tablet(s) Oral at bedtime  sodium chloride 0.9% lock flush 3 milliLiter(s) IV Push every 8 hours    MEDICATIONS  (PRN):  acetaminophen     Tablet .. 650 milliGRAM(s) Oral every 6 hours PRN Moderate Pain (4 - 6)  oxyCODONE    IR 5 milliGRAM(s) Oral every 8 hours PRN Moderate Pain (4 - 6)      Allergies    No Known Allergies    Intolerances        OBJECTIVE:  Vital Signs Last 24 Hrs  T(C): 36.3 (10 Oct 2023 13:05), Max: 36.9 (09 Oct 2023 21:20)  T(F): 97.4 (10 Oct 2023 13:05), Max: 98.4 (09 Oct 2023 21:20)  HR: 66 (10 Oct 2023 12:02) (59 - 80)  BP: 113/64 (10 Oct 2023 12:02) (92/53 - 113/64)  BP(mean): 82 (10 Oct 2023 12:02) (67 - 84)  RR: 20 (10 Oct 2023 12:02) (16 - 20)  SpO2: 97% (10 Oct 2023 12:02) (94% - 97%)    Parameters below as of 10 Oct 2023 12:02  Patient On (Oxygen Delivery Method): room air      I&O's Summary    09 Oct 2023 07:01  -  10 Oct 2023 07:00  --------------------------------------------------------  IN: 0 mL / OUT: 995 mL / NET: -995 mL    10 Oct 2023 07:01  -  10 Oct 2023 14:04  --------------------------------------------------------  IN: 0 mL / OUT: 0 mL / NET: 0 mL        PHYSICAL EXAM:  Gen: Reclining in bed at time of exam, appears stated age  HEENT: NCAT, MMM, clear OP  Neck: supple, trachea at midline  CV: RRR, +S1/S2  Pulm: adequate respiratory effort, no increase in work of breathing  Abd: soft, NTND  Skin: warm and dry,   Ext: WWP, no LE edema  Neuro: AOx3, no gross focal neurological deficits  Psych: affect and behavior appropriate, pleasant at time of interview  :     LABS:                        14.4   6.46  )-----------( 164      ( 10 Oct 2023 05:30 )             43.4     10-10    142  |  106  |  16  ----------------------------<  99  4.3   |  28  |  0.90    Ca    9.6      10 Oct 2023 05:30  Mg     2.3     10-10          CAPILLARY BLOOD GLUCOSE        Urinalysis Basic - ( 10 Oct 2023 05:30 )    Color: x / Appearance: x / SG: x / pH: x  Gluc: 99 mg/dL / Ketone: x  / Bili: x / Urobili: x   Blood: x / Protein: x / Nitrite: x   Leuk Esterase: x / RBC: x / WBC x   Sq Epi: x / Non Sq Epi: x / Bacteria: x        MICRODATA:      RADIOLOGY/OTHER STUDIES:    PCP  Pharmacy:   Emergency contact:

## 2023-10-10 NOTE — PROGRESS NOTE ADULT - ASSESSMENT
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 Sputum AF[employer] smear is negativex3, culture negative x 4 weeks. CT scan reveals YARITZA and LLL interstitial lung disease. 9/15/23 pt underwent L VATS YARITZA and LLL wedge resection with Dr. Good. OR course uneventful and recovered post op without difficulty. Pt reports feeling some new L sided CP last week with some associated crackling. At his routine f/u appointment 10/6/23, CXR revealed large PTX and patient was sent to ER for further management. Left sided pigtail placed. 10/7/23 lung remains up on AM Xray. CT placed to water seal. 10/8/23 increased pneumo on morning CXR, placed back to suction at -40 per Dr. Good.  Today, still has residual apical and lateral pneumothorax on morning Xray while tube remains on waterseal.  With this, patient is hemodynamically stable and denies SOB or pleuritic CP. ***INCOMPLETE***      Plan:    Neurovascular:   -Pain well controlled with current regimen. PRN's:    Cardiovascular:   -Hemodynamically stable.   -Monitor: BP, HR, tele    Respiratory:   -Oxygenating well on room air  -Encourage continued use of IS 10x/hr and frequent ambulation  -CXR:    GI:  -GI PPX:  -PO Diet  -Bowel Regimen:     Renal / :  -Continue to monitor renal function: BUN/Cr  -Monitor I/O's daily     Endocrine:    -No hx of DM or thyroid dx  -A1c:  -TSH:    Hematologic:  -CBC: H/H-  -Coagulation Panel.    ID:  -Temperature:   -CBC: WBC-  -Continue to observe for SIRS/Sepsis Syndrome.    Prophylaxis:  -DVT prophylaxis with 5000 SubQ Heparin q8h.  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 Sputum AFB smear is negativex3, culture negative x 4 weeks. CT scan reveals YARITZA and LLL interstitial lung disease. 9/15/23 pt underwent L VATS YARITZA and LLL wedge resection with Dr. Good. OR course uneventful and recovered post op without difficulty. Pt reports feeling some new L sided CP last week with some associated crackling. At his routine f/u appointment 10/6/23, CXR revealed large PTX and patient was sent to ER for further management. Left sided pigtail placed. 10/7/23 lung remains up on AM Xray. CT placed to water seal. 10/8/23 increased pneumo on morning CXR, placed back to suction at -40 per Dr. Good.  Today, still has residual apical and lateral pneumothorax on morning Xray while tube remains on waterseal.  With this, patient is hemodynamically stable and denies SOB or pleuritic CP. 10/10/23 Patient went down to IR for pigtail placement.  Plan today is for pleurodesis with doxycycline today.       Plan:    Neurovascular:   Tylenol   PRN's: Oxycodone  Pain well controlled with current regimen    Cardiovascular:   -Hemodynamically stable.   -Monitor: BP, HR, tele    Respiratory:   -Oxygenating well on room air  -Encourage continued use of IS 10x/hr and frequent ambulation  -CXR:    GI:  pantoprazole   -GI PPX:  -PO Diet: Regular   -Bowel Regimen: Polyethylene glycol and senna     Renal / :  -Continue to monitor renal function: BUN/Cr  -Monitor I/O's daily     Endocrine:    -No hx of DM or thyroid dx  -A1c:   -TSH:    Hematologic:  -CBC:   H/H-  -Coagulation Panel.    ID:  -Temperature:   -CBC: WBC-  -Continue to observe for SIRS/Sepsis Syndrome.    Prophylaxis:  -DVT prophylaxis with 5000 SubQ Heparin q8h.  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 Sputum AFB smear is negativex3, culture negative x 4 weeks. CT scan reveals YARITZA and LLL interstitial lung disease. 9/15/23 pt underwent L VATS YARITZA and LLL wedge resection with Dr. Good. OR course uneventful and recovered post op without difficulty. Pt reports feeling some new L sided CP last week with some associated crackling. At his routine f/u appointment 10/6/23, CXR revealed large PTX and patient was sent to ER for further management. Left sided pigtail placed. 10/7/23 lung remains up on AM Xray. CT placed to water seal. 10/8/23 increased pneumo on morning CXR, placed back to suction at -40 per Dr. Good.  Today, still has residual apical and lateral pneumothorax on morning Xray while tube remains on waterseal.  With this, patient is hemodynamically stable and denies SOB or pleuritic CP. 10/10/23 Patient went down to IR for pigtail placement.  Plan today is for pleurodesis with doxycycline today.       Plan:    Neurovascular:   Tylenol   PRN's: Oxycodone  Pain well controlled with current regimen    Cardiovascular:   -Hemodynamically stable.   -Monitor: BP, HR, tele    Respiratory:   -Oxygenating well on room air  -Encourage continued use of IS 10x/hr and frequent ambulation  -CXR:    GI:  -GI PPX: pantoprazole   -PO Diet: Regular   -Bowel Regimen: Polyethylene glycol and senna     Renal / :  -Continue to monitor renal function: BUN/Cr  -Monitor I/O's daily     Endocrine:    -No hx of DM or thyroid dx    Hematologic:  -CBC: WNL  H/H- stable  -Coagulation Panel.    ID:  -Temperature: afebrile  -CBC/WBC- WNL  -Continue to observe for SIRS/Sepsis Syndrome.    Prophylaxis:  -DVT prophylaxis with 5000 SubQ Heparin q8h.  -Continue with SCD's b/l while patient is at rest     Disposition:  -Discharge home?   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 Sputum AFB smear is negativex3, culture negative x 4 weeks. CT scan reveals YARITZA and LLL interstitial lung disease. 9/15/23 pt underwent L VATS YARITZA and LLL wedge resection with Dr. Good. OR course uneventful and recovered post op without difficulty. Pt reports feeling some new L sided CP last week with some associated crackling. At his routine f/u appointment 10/6/23, CXR revealed large PTX and patient was sent to ER for further management. Left sided pigtail placed. 10/7/23 lung remains up on AM Xray. CT placed to water seal. 10/8/23 increased pneumo on morning CXR, placed back to suction at -40 per Dr. Good.  Today, still has residual apical and lateral pneumothorax on morning Xray while tube remains on waterseal.  With this, patient is hemodynamically stable and denies SOB or pleuritic CP. 10/10/23 Patient went down to IR for apical pigtail placement.  Plan is for pleurodesis with doxycycline today.       Plan:    Neurovascular:    PRN's: Oxycodone, tylenol  Pain well controlled with current regimen    Cardiovascular:   -Hemodynamically stable.   -Monitor: BP, HR, tele    Respiratory:   -Oxygenating well on room air  -ILD    -c/w prednisone 1 mg QD  -Encourage continued use of IS 10x/hr and frequent ambulation  -CXR: Apical and Balisar CT in place, PTX slightly increased    GI:  -GI PPX: pantoprazole   -PO Diet: Regular   -Bowel Regimen: miralax and senna     Renal / :  -Continue to monitor renal function: BUN/Cr 16/.9  -Monitor I/O's daily     Endocrine:    -No hx of DM or thyroid dx    Hematologic:  H/H- 14/43  -Coagulation Panel.    ID:  -Temperature: afebrile  -WBC- 6.4  -Continue to observe for SIRS/Sepsis Syndrome.    Prophylaxis:  -DVT prophylaxis with 5000 SubQ Heparin q8h.  -Continue with SCD's b/l while patient is at rest     Disposition:  -Home when medically ready

## 2023-10-10 NOTE — PROGRESS NOTE ADULT - SUBJECTIVE AND OBJECTIVE BOX
Patient discussed on morning rounds with       OPERATION & DATE:    SUBJECTIVE ASSESSMENT:    VITAL SIGNS:  Vital Signs Last 24 Hrs  T(C): 36.3 (10 Oct 2023 05:31), Max: 36.9 (09 Oct 2023 21:20)  T(F): 97.4 (10 Oct 2023 05:31), Max: 98.4 (09 Oct 2023 21:20)  HR: 76 (10 Oct 2023 08:35) (59 - 91)  BP: 108/71 (10 Oct 2023 08:35) (92/53 - 108/71)  BP(mean): 84 (10 Oct 2023 08:35) (67 - 84)  RR: 20 (10 Oct 2023 08:35) (16 - 20)  SpO2: 96% (10 Oct 2023 08:35) (94% - 99%)    Parameters below as of 10 Oct 2023 08:35  Patient On (Oxygen Delivery Method): room air      I&O's Detail    09 Oct 2023 07:01  -  10 Oct 2023 07:00  --------------------------------------------------------  IN:  Total IN: 0 mL    OUT:    Chest Tube (mL): 20 mL    Voided (mL): 975 mL  Total OUT: 995 mL    Total NET: -995 mL        CHEST TUBE:    MISHA DRAIN:    EPICARDIAL WIRES:   STITCHES:  STAPLES:  JOEL:   CENTRAL LINE:  MIDLINE/PICC:  WOUND VAC:    PHYSICAL EXAM:  General:  HEENT:  Cardio:  Pulm:  GI:  Extremities:  Vascular:  Incisions:    LABS:                        14.4   6.46  )-----------( 164      ( 10 Oct 2023 05:30 )             43.4       10-10    142  |  106  |  16  ----------------------------<  99  4.3   |  28  |  0.90    Ca    9.6      10 Oct 2023 05:30  Mg     2.3     10-10      Urinalysis Basic - ( 10 Oct 2023 05:30 )    Color: x / Appearance: x / SG: x / pH: x  Gluc: 99 mg/dL / Ketone: x  / Bili: x / Urobili: x   Blood: x / Protein: x / Nitrite: x   Leuk Esterase: x / RBC: x / WBC x   Sq Epi: x / Non Sq Epi: x / Bacteria: x      MEDICATIONS  (STANDING):  doxycycline Intrapleural Syringe 500 milliGRAM(s) IntraPleural. once  heparin   Injectable 5000 Unit(s) SubCutaneous every 8 hours  influenza   Vaccine 0.5 milliLiter(s) IntraMuscular once  lidocaine 1% Injectable 20 milliLiter(s) Local Injection once  pantoprazole    Tablet 40 milliGRAM(s) Oral before breakfast  polyethylene glycol 3350 17 Gram(s) Oral daily  potassium chloride   Powder 40 milliEquivalent(s) Oral once  predniSONE   Tablet 1 milliGRAM(s) Oral daily  senna 2 Tablet(s) Oral at bedtime  sodium chloride 0.9% lock flush 3 milliLiter(s) IV Push every 8 hours    MEDICATIONS  (PRN):  acetaminophen     Tablet .. 650 milliGRAM(s) Oral every 6 hours PRN Moderate Pain (4 - 6)  oxyCODONE    IR 5 milliGRAM(s) Oral every 8 hours PRN Moderate Pain (4 - 6)    RADIOLOGY & ADDITIONAL TESTS:       Patient discussed on morning rounds with Dr. Good    OPERATION & DATE:  L VATS YARITZA and LLL wedge resection on 09/15/2023    SUBJECTIVE ASSESSMENT:  Translation via Dr. Scanlon  Patient states he feels well today. He reports some pain at chest tube site.       VITAL SIGNS:  Vital Signs Last 24 Hrs  T(C): 36.3 (10 Oct 2023 05:31), Max: 36.9 (09 Oct 2023 21:20)  T(F): 97.4 (10 Oct 2023 05:31), Max: 98.4 (09 Oct 2023 21:20)  HR: 76 (10 Oct 2023 08:35) (59 - 91)  BP: 108/71 (10 Oct 2023 08:35) (92/53 - 108/71)  BP(mean): 84 (10 Oct 2023 08:35) (67 - 84)  RR: 20 (10 Oct 2023 08:35) (16 - 20)  SpO2: 96% (10 Oct 2023 08:35) (94% - 99%)    Parameters below as of 10 Oct 2023 08:35  Patient On (Oxygen Delivery Method): room air      I&O's Detail    09 Oct 2023 07:01  -  10 Oct 2023 07:00  --------------------------------------------------------  IN:  Total IN: 0 mL    OUT:    Chest Tube (mL): 20 mL    Voided (mL): 975 mL  Total OUT: 995 mL    Total NET: -995 mL        CHEST TUBE:    MISHA DRAIN:    EPICARDIAL WIRES:   STITCHES:  STAPLES:  JOEL:   CENTRAL LINE:  MIDLINE/PICC:  WOUND VAC:    PHYSICAL EXAM:  General: Patient resting comfortably in bed, no acute distress  HEENT: No obvious deformities  Cardio:   Pulm:  GI: Abdomen soft and nondistended  Extremities: No deformities, normal temperature, full ROM  Vascular: No edema of the foot or ankles, no discolorations or calf tenderness. Palpable pedal pulses  Incisions:    LABS:                        14.4   6.46  )-----------( 164      ( 10 Oct 2023 05:30 )             43.4       10-10    142  |  106  |  16  ----------------------------<  99  4.3   |  28  |  0.90    Ca    9.6      10 Oct 2023 05:30  Mg     2.3     10-10      Urinalysis Basic - ( 10 Oct 2023 05:30 )    Color: x / Appearance: x / SG: x / pH: x  Gluc: 99 mg/dL / Ketone: x  / Bili: x / Urobili: x   Blood: x / Protein: x / Nitrite: x   Leuk Esterase: x / RBC: x / WBC x   Sq Epi: x / Non Sq Epi: x / Bacteria: x      MEDICATIONS  (STANDING):  doxycycline Intrapleural Syringe 500 milliGRAM(s) IntraPleural. once  heparin   Injectable 5000 Unit(s) SubCutaneous every 8 hours  influenza   Vaccine 0.5 milliLiter(s) IntraMuscular once  lidocaine 1% Injectable 20 milliLiter(s) Local Injection once  pantoprazole    Tablet 40 milliGRAM(s) Oral before breakfast  polyethylene glycol 3350 17 Gram(s) Oral daily  potassium chloride   Powder 40 milliEquivalent(s) Oral once  predniSONE   Tablet 1 milliGRAM(s) Oral daily  senna 2 Tablet(s) Oral at bedtime  sodium chloride 0.9% lock flush 3 milliLiter(s) IV Push every 8 hours    MEDICATIONS  (PRN):  acetaminophen     Tablet .. 650 milliGRAM(s) Oral every 6 hours PRN Moderate Pain (4 - 6)  oxyCODONE    IR 5 milliGRAM(s) Oral every 8 hours PRN Moderate Pain (4 - 6)    RADIOLOGY & ADDITIONAL TESTS:       Patient discussed on morning rounds with Dr. Good    OPERATION & DATE:  L VATS YARITZA and LLL wedge resection on 09/15/2023    SUBJECTIVE ASSESSMENT:  Translation via Dr. Scanlon  Patient states he feels well today. He reports some pain at chest tube site. Patient went to IR today to have second pigtail placed.   No acute complaints at this time.      VITAL SIGNS:  Vital Signs Last 24 Hrs  T(C): 36.3 (10 Oct 2023 05:31), Max: 36.9 (09 Oct 2023 21:20)  T(F): 97.4 (10 Oct 2023 05:31), Max: 98.4 (09 Oct 2023 21:20)  HR: 76 (10 Oct 2023 08:35) (59 - 91)  BP: 108/71 (10 Oct 2023 08:35) (92/53 - 108/71)  BP(mean): 84 (10 Oct 2023 08:35) (67 - 84)  RR: 20 (10 Oct 2023 08:35) (16 - 20)  SpO2: 96% (10 Oct 2023 08:35) (94% - 99%)    Parameters below as of 10 Oct 2023 08:35  Patient On (Oxygen Delivery Method): room air      I&O's Detail    09 Oct 2023 07:01  -  10 Oct 2023 07:00  --------------------------------------------------------  IN:  Total IN: 0 mL    OUT:    Chest Tube (mL): 20 mL    Voided (mL): 975 mL  Total OUT: 995 mL    Total NET: -995 mL        CHEST TUBE:  Two pigtails in place. Attached to suction.  MISHA DRAIN:  None  EPICARDIAL WIRES: None  STITCHES: None  STAPLES: None  JOEL: None  CENTRAL LINE: None  MIDLINE/PICC: None  WOUND VAC: None    PHYSICAL EXAM:  General: Patient resting comfortably in bed, no acute distress  HEENT: No obvious deformities  Cardio: Normal S1 & S2. No gallops, murmurs, or rubs  Pulm: Lungs clear to ascultation. No wheezes, rales, or rhonchi.  GI: Abdomen soft and nondistended  Extremities: No deformities, normal temperature, full ROM  Vascular: No edema of the foot or ankles, no discolorations or calf tenderness. Palpable pedal pulses  Incisions:     LABS:                        14.4   6.46  )-----------( 164      ( 10 Oct 2023 05:30 )             43.4       10-10    142  |  106  |  16  ----------------------------<  99  4.3   |  28  |  0.90    Ca    9.6      10 Oct 2023 05:30  Mg     2.3     10-10      Urinalysis Basic - ( 10 Oct 2023 05:30 )    Color: x / Appearance: x / SG: x / pH: x  Gluc: 99 mg/dL / Ketone: x  / Bili: x / Urobili: x   Blood: x / Protein: x / Nitrite: x   Leuk Esterase: x / RBC: x / WBC x   Sq Epi: x / Non Sq Epi: x / Bacteria: x      MEDICATIONS  (STANDING):  doxycycline Intrapleural Syringe 500 milliGRAM(s) IntraPleural. once  heparin   Injectable 5000 Unit(s) SubCutaneous every 8 hours  influenza   Vaccine 0.5 milliLiter(s) IntraMuscular once  lidocaine 1% Injectable 20 milliLiter(s) Local Injection once  pantoprazole    Tablet 40 milliGRAM(s) Oral before breakfast  polyethylene glycol 3350 17 Gram(s) Oral daily  potassium chloride   Powder 40 milliEquivalent(s) Oral once  predniSONE   Tablet 1 milliGRAM(s) Oral daily  senna 2 Tablet(s) Oral at bedtime  sodium chloride 0.9% lock flush 3 milliLiter(s) IV Push every 8 hours    MEDICATIONS  (PRN):  acetaminophen     Tablet .. 650 milliGRAM(s) Oral every 6 hours PRN Moderate Pain (4 - 6)  oxyCODONE    IR 5 milliGRAM(s) Oral every 8 hours PRN Moderate Pain (4 - 6)    RADIOLOGY & ADDITIONAL TESTS:       Patient discussed on morning rounds with Dr. Good    OPERATION & DATE:  L VATS YARITZA and LLL wedge resection on 09/15/2023    SUBJECTIVE ASSESSMENT:  Translation via Dr. Arceo  Patient states he feels well today. He reports some pain at chest tube site. Patient went to IR today to have second pigtail placed.   No acute complaints at this time.      VITAL SIGNS:  Vital Signs Last 24 Hrs  T(C): 36.3 (10 Oct 2023 05:31), Max: 36.9 (09 Oct 2023 21:20)  T(F): 97.4 (10 Oct 2023 05:31), Max: 98.4 (09 Oct 2023 21:20)  HR: 76 (10 Oct 2023 08:35) (59 - 91)  BP: 108/71 (10 Oct 2023 08:35) (92/53 - 108/71)  BP(mean): 84 (10 Oct 2023 08:35) (67 - 84)  RR: 20 (10 Oct 2023 08:35) (16 - 20)  SpO2: 96% (10 Oct 2023 08:35) (94% - 99%)    Parameters below as of 10 Oct 2023 08:35  Patient On (Oxygen Delivery Method): room air      I&O's Detail    09 Oct 2023 07:01  -  10 Oct 2023 07:00  --------------------------------------------------------  IN:  Total IN: 0 mL    OUT:    Chest Tube (mL): 20 mL    Voided (mL): 975 mL  Total OUT: 995 mL    Total NET: -995 mL        CHEST TUBE:  Two pigtails in place. to suction.  MISHA DRAIN:  None  EPICARDIAL WIRES: None  STITCHES: None  STAPLES: None  JOEL: None  CENTRAL LINE: None  MIDLINE/PICC: None  WOUND VAC: None    PHYSICAL EXAM:  General: Patient resting comfortably in bed, no acute distress  HEENT: No obvious deformities  Cardio: Normal S1 & S2. No gallops, murmurs, or rubs  Pulm: Lungs clear to ascultation. No wheezes, rales, or rhonchi.  GI: Abdomen soft and nondistended  Extremities: No deformities, normal temperature, full ROM  Vascular: No edema of the foot or ankles, no discolorations or calf tenderness. Palpable pedal pulses  Incisions: VATS incisions clean, dry, intact    LABS:                        14.4   6.46  )-----------( 164      ( 10 Oct 2023 05:30 )             43.4       10-10    142  |  106  |  16  ----------------------------<  99  4.3   |  28  |  0.90    Ca    9.6      10 Oct 2023 05:30  Mg     2.3     10-10      Urinalysis Basic - ( 10 Oct 2023 05:30 )    Color: x / Appearance: x / SG: x / pH: x  Gluc: 99 mg/dL / Ketone: x  / Bili: x / Urobili: x   Blood: x / Protein: x / Nitrite: x   Leuk Esterase: x / RBC: x / WBC x   Sq Epi: x / Non Sq Epi: x / Bacteria: x      MEDICATIONS  (STANDING):  doxycycline Intrapleural Syringe 500 milliGRAM(s) IntraPleural. once  heparin   Injectable 5000 Unit(s) SubCutaneous every 8 hours  influenza   Vaccine 0.5 milliLiter(s) IntraMuscular once  lidocaine 1% Injectable 20 milliLiter(s) Local Injection once  pantoprazole    Tablet 40 milliGRAM(s) Oral before breakfast  polyethylene glycol 3350 17 Gram(s) Oral daily  potassium chloride   Powder 40 milliEquivalent(s) Oral once  predniSONE   Tablet 1 milliGRAM(s) Oral daily  senna 2 Tablet(s) Oral at bedtime  sodium chloride 0.9% lock flush 3 milliLiter(s) IV Push every 8 hours    MEDICATIONS  (PRN):  acetaminophen     Tablet .. 650 milliGRAM(s) Oral every 6 hours PRN Moderate Pain (4 - 6)  oxyCODONE    IR 5 milliGRAM(s) Oral every 8 hours PRN Moderate Pain (4 - 6)    RADIOLOGY & ADDITIONAL TESTS:

## 2023-10-10 NOTE — CONSULT NOTE ADULT - ASSESSMENT
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 Sputum AFB smear is negativex3, culture negative x 4 weeks. CT scan reveals YARITZA and LLL interstitial lung disease. 9/15/23 pt underwent L VATS YARITZA and LLL wedge resection with Dr. Good. f/u appointment 10/6/23, CXR revealed large PTX and patient was sent to ER for further management. Left sided pigtail placed. 10/7/23 lung remains up on AM Xray. CT placed to water seal. 10/8/23 increased pneumo on morning CXR, placed back to suction at -40 per Dr. Good. Today, still has residual apical and lateral pneumothorax on morning Xray while tube remains on water seal, no air leak. IR consulted for left apical chest tube placement. Case reviewed with Dr. Wise, plan for procedure with sedation.     Recommendations: Maintain NPO x 8 hours prior to procedure. D/C blood thinners.     Communicated with: Sonia SKELTON

## 2023-10-10 NOTE — CONSULT NOTE ADULT - ASSESSMENT
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 Sputum AFB smear is negativex3, culture negative x 4 weeks. CT scan reveals YARITZA and LLL interstitial lung disease. 9/15/23 pt underwent L VATS YARITZA and LLL wedge resection with Dr. Good. OR course was uneventful and recovered post op without difficulty. 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 Pt reports feeling some new L sided CP last week with some associated crackling. At his routine f/u appointment 10/6/23, CXR revealed large PTX and patient was sent to ER for further management. Left sided pigtail placed. 10/7/23 lung remains up on AM Xray. CT placed to water seal. 10/8/23 increased pneumo on morning CXR, placed back to suction at -40 per Dr. Good.  Today, still has residual apical and lateral pneumothorax on morning Xray while tube remains on water seal, no air leak. IR consulted for left apical chest tube placement. and CTS plan on doxycycline pleurodesis as d/w pt, daughter and CTS team Dr. Polo this am.    - active care per CTS team, Dr Valiente   - IR consult appreciated, plan for L apical chest tube placement   - f/u CT/CXR  - Doxycycline pleurodesis per CTS   - pain management   lidocaine 1% Injectable 20 milliLiter(s) Local Injection once  lidocaine 2% Injectable 5 milliLiter(s) Local Injection once  acetaminophen     Tablet .. 650 milliGRAM(s) Oral every 6 hours PRN Moderate Pain (4 - 6)  oxyCODONE    IR 5 milliGRAM(s) Oral every 8 hours PRN Moderate Pain (4 - 6)  bowel regimen:   polyethylene glycol 3350 17 Gram(s) Oral daily  senna 2 Tablet(s) Oral at bedtime  - ILD: predniSONE   Tablet 1 milliGRAM(s) Oral daily  - GI ppx: pantoprazole    Tablet 40 milliGRAM(s) Oral before breakfast  - DVT ppx: heparin   Injectable 5000 Unit(s) SubCutaneous every 8 hours  - IS at bedside     Contacts:  PMD: Dr. Jamarcus Gordon   Pulm: Dr. Teo Álvarez   Daughter: Lynnscottie Gordon [phone]    POC as d/w CTS Dr. 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 Sputum AF[employer] smear is negativex3, culture negative x 4 weeks. CT scan reveals YARITZA and LLL interstitial lung disease. 9/15/23 pt underwent L VATS YARITZA and LLL wedge resection with Dr. Good. OR course was uneventful and recovered post op without difficulty. 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 Pt reports feeling some new L sided CP last week with some associated crackling. At his routine f/u appointment 10/6/23, CXR revealed large PTX and patient was sent to ER for further management. Left sided pigtail placed. 10/7/23 lung remains up on AM Xray. CT placed to water seal. 10/8/23 increased pneumo on morning CXR, placed back to suction at -40 per Dr. Good.  Today, still has residual apical and lateral pneumothorax on morning Xray while tube remains on water seal, no air leak. IR consulted for left apical chest tube placement. and CTS plan on doxycycline pleurodesis as d/w pt, daughter and CTS team Dr. Polo this am.    - active care per CTS team, Dr Valiente   - IR consult appreciated, plan for L apical chest tube placement   - f/u CT/CXR  - Doxycycline pleurodesis per CTS   - pain management   lidocaine 1% Injectable 20 milliLiter(s) Local Injection once  lidocaine 2% Injectable 5 milliLiter(s) Local Injection once  acetaminophen     Tablet .. 650 milliGRAM(s) Oral every 6 hours PRN Moderate Pain (4 - 6)  oxyCODONE    IR 5 milliGRAM(s) Oral every 8 hours PRN Moderate Pain (4 - 6)  bowel regimen:   polyethylene glycol 3350 17 Gram(s) Oral daily  senna 2 Tablet(s) Oral at bedtime  - ILD: predniSONE   Tablet 1 milliGRAM(s) Oral daily ?? ( Pt was taking Prednisone 20mg daily at home)   - GI ppx: pantoprazole    Tablet 40 milliGRAM(s) Oral before breakfast  - DVT ppx: heparin   Injectable 5000 Unit(s) SubCutaneous every 8 hours  - IS at bedside     Contacts:  PMD: Dr. Jamarcus Gordon   Pulm: Dr. Teo Álvarez   Daughter: Lynn Gordon [phone]    POC as d/w CTS Dr. Tianna Polo and CTS team

## 2023-10-11 LAB
ANION GAP SERPL CALC-SCNC: 10 MMOL/L — SIGNIFICANT CHANGE UP (ref 5–17)
BUN SERPL-MCNC: 21 MG/DL — SIGNIFICANT CHANGE UP (ref 7–23)
CALCIUM SERPL-MCNC: 9.5 MG/DL — SIGNIFICANT CHANGE UP (ref 8.4–10.5)
CHLORIDE SERPL-SCNC: 101 MMOL/L — SIGNIFICANT CHANGE UP (ref 96–108)
CO2 SERPL-SCNC: 27 MMOL/L — SIGNIFICANT CHANGE UP (ref 22–31)
CREAT SERPL-MCNC: 1.02 MG/DL — SIGNIFICANT CHANGE UP (ref 0.5–1.3)
EGFR: 88 ML/MIN/1.73M2 — SIGNIFICANT CHANGE UP
GLUCOSE SERPL-MCNC: 128 MG/DL — HIGH (ref 70–99)
HCT VFR BLD CALC: 49.2 % — SIGNIFICANT CHANGE UP (ref 39–50)
HGB BLD-MCNC: 17 G/DL — SIGNIFICANT CHANGE UP (ref 13–17)
MCHC RBC-ENTMCNC: 32.1 PG — SIGNIFICANT CHANGE UP (ref 27–34)
MCHC RBC-ENTMCNC: 34.6 GM/DL — SIGNIFICANT CHANGE UP (ref 32–36)
MCV RBC AUTO: 92.8 FL — SIGNIFICANT CHANGE UP (ref 80–100)
NRBC # BLD: 0 /100 WBCS — SIGNIFICANT CHANGE UP (ref 0–0)
PLATELET # BLD AUTO: 170 K/UL — SIGNIFICANT CHANGE UP (ref 150–400)
POTASSIUM SERPL-MCNC: 4.4 MMOL/L — SIGNIFICANT CHANGE UP (ref 3.5–5.3)
POTASSIUM SERPL-SCNC: 4.4 MMOL/L — SIGNIFICANT CHANGE UP (ref 3.5–5.3)
RBC # BLD: 5.3 M/UL — SIGNIFICANT CHANGE UP (ref 4.2–5.8)
RBC # FLD: 11.9 % — SIGNIFICANT CHANGE UP (ref 10.3–14.5)
SODIUM SERPL-SCNC: 138 MMOL/L — SIGNIFICANT CHANGE UP (ref 135–145)
WBC # BLD: 10.19 K/UL — SIGNIFICANT CHANGE UP (ref 3.8–10.5)
WBC # FLD AUTO: 10.19 K/UL — SIGNIFICANT CHANGE UP (ref 3.8–10.5)

## 2023-10-11 PROCEDURE — 71045 X-RAY EXAM CHEST 1 VIEW: CPT | Mod: 26

## 2023-10-11 PROCEDURE — 99232 SBSQ HOSP IP/OBS MODERATE 35: CPT

## 2023-10-11 RX ORDER — SODIUM CHLORIDE 9 MG/ML
500 INJECTION, SOLUTION INTRAVENOUS
Refills: 0 | Status: DISCONTINUED | OUTPATIENT
Start: 2023-10-11 | End: 2023-10-14

## 2023-10-11 RX ORDER — METOPROLOL TARTRATE 50 MG
12.5 TABLET ORAL EVERY 12 HOURS
Refills: 0 | Status: DISCONTINUED | OUTPATIENT
Start: 2023-10-11 | End: 2023-10-14

## 2023-10-11 RX ADMIN — SODIUM CHLORIDE 3 MILLILITER(S): 9 INJECTION INTRAMUSCULAR; INTRAVENOUS; SUBCUTANEOUS at 21:24

## 2023-10-11 RX ADMIN — OXYCODONE HYDROCHLORIDE 5 MILLIGRAM(S): 5 TABLET ORAL at 13:53

## 2023-10-11 RX ADMIN — SODIUM CHLORIDE 3 MILLILITER(S): 9 INJECTION INTRAMUSCULAR; INTRAVENOUS; SUBCUTANEOUS at 13:53

## 2023-10-11 RX ADMIN — HEPARIN SODIUM 5000 UNIT(S): 5000 INJECTION INTRAVENOUS; SUBCUTANEOUS at 05:51

## 2023-10-11 RX ADMIN — Medication 10 MILLIGRAM(S): at 05:51

## 2023-10-11 RX ADMIN — PANTOPRAZOLE SODIUM 40 MILLIGRAM(S): 20 TABLET, DELAYED RELEASE ORAL at 05:51

## 2023-10-11 RX ADMIN — SODIUM CHLORIDE 3 MILLILITER(S): 9 INJECTION INTRAMUSCULAR; INTRAVENOUS; SUBCUTANEOUS at 05:59

## 2023-10-11 RX ADMIN — HEPARIN SODIUM 5000 UNIT(S): 5000 INJECTION INTRAVENOUS; SUBCUTANEOUS at 21:20

## 2023-10-11 RX ADMIN — HEPARIN SODIUM 5000 UNIT(S): 5000 INJECTION INTRAVENOUS; SUBCUTANEOUS at 13:49

## 2023-10-11 RX ADMIN — Medication 10 MILLIGRAM(S): at 13:48

## 2023-10-11 RX ADMIN — SENNA PLUS 2 TABLET(S): 8.6 TABLET ORAL at 21:20

## 2023-10-11 RX ADMIN — OXYCODONE HYDROCHLORIDE 5 MILLIGRAM(S): 5 TABLET ORAL at 13:05

## 2023-10-11 RX ADMIN — Medication 12.5 MILLIGRAM(S): at 17:06

## 2023-10-11 RX ADMIN — SODIUM CHLORIDE 50 MILLILITER(S): 9 INJECTION, SOLUTION INTRAVENOUS at 17:07

## 2023-10-11 RX ADMIN — POLYETHYLENE GLYCOL 3350 17 GRAM(S): 17 POWDER, FOR SOLUTION ORAL at 11:29

## 2023-10-11 NOTE — PROGRESS NOTE ADULT - SUBJECTIVE AND OBJECTIVE BOX
Patient is a 52y old  Male who presents with a chief complaint of Pneumothorax (11 Oct 2023 12:59)    INTERVAL EVENTS: NAEON     SUBJECTIVE:  Patient was seen and examined at bedside. Pain ~ 5/10 over CT sites, reports no BM for 4-5 days. Denies SOB. able to pull ~ 800ml IS    Review of systems: No fever, chills, dizziness, HA, Changes in vision, CP, dyspnea, nausea or vomiting, dysuria, changes in bowel movements, LE edema. Rest of 12 point Review of systems negative unless otherwise documented elsewhere in note.     Diet, Regular (10-07-23 @ 07:57) [Active]      MEDICATIONS:  MEDICATIONS  (STANDING):  heparin   Injectable 5000 Unit(s) SubCutaneous every 8 hours  influenza   Vaccine 0.5 milliLiter(s) IntraMuscular once  pantoprazole    Tablet 40 milliGRAM(s) Oral before breakfast  polyethylene glycol 3350 17 Gram(s) Oral daily  potassium chloride   Powder 40 milliEquivalent(s) Oral once  predniSONE   Tablet 10 milliGRAM(s) Oral daily  senna 2 Tablet(s) Oral at bedtime  sodium chloride 0.9% lock flush 3 milliLiter(s) IV Push every 8 hours    MEDICATIONS  (PRN):  acetaminophen     Tablet .. 650 milliGRAM(s) Oral every 6 hours PRN Moderate Pain (4 - 6)  oxyCODONE    IR 5 milliGRAM(s) Oral every 8 hours PRN Moderate Pain (4 - 6)      Allergies    No Known Allergies    Intolerances        OBJECTIVE:  Vital Signs Last 24 Hrs  T(C): 36.3 (11 Oct 2023 13:51), Max: 36.8 (11 Oct 2023 10:03)  T(F): 97.4 (11 Oct 2023 13:51), Max: 98.3 (11 Oct 2023 10:03)  HR: 106 (11 Oct 2023 13:16) (72 - 106)  BP: 104/73 (11 Oct 2023 13:16) (104/65 - 106/66)  BP(mean): 84 (11 Oct 2023 13:16) (80 - 84)  RR: 16 (11 Oct 2023 13:16) (15 - 18)  SpO2: 93% (11 Oct 2023 13:16) (93% - 97%)    Parameters below as of 11 Oct 2023 13:16  Patient On (Oxygen Delivery Method): room air      I&O's Summary    10 Oct 2023 07:01  -  11 Oct 2023 07:00  --------------------------------------------------------  IN: 150 mL / OUT: 200 mL / NET: -50 mL    11 Oct 2023 07:01  -  11 Oct 2023 15:20  --------------------------------------------------------  IN: 0 mL / OUT: 62 mL / NET: -62 mL        PHYSICAL EXAM:  Gen: Reclining in bed at time of exam, appears stated age  HEENT: NCAT, MMM, clear OP  Neck: supple, trachea at midline  CV: RRR, +S1/S2  Pulm: adequate respiratory effort, no increase in work of breathing, R CT x2   Abd: soft, NTND  Skin: warm and dry,   Ext: WWP, no LE edema  Neuro: AOx3, no gross focal neurological deficits  Psych: affect and behavior appropriate, pleasant at time of interview  :     LABS:                        14.4   6.46  )-----------( 164      ( 10 Oct 2023 05:30 )             43.4     10-10    142  |  106  |  16  ----------------------------<  99  4.3   |  28  |  0.90    Ca    9.6      10 Oct 2023 05:30  Mg     2.3     10-10          CAPILLARY BLOOD GLUCOSE        Urinalysis Basic - ( 10 Oct 2023 05:30 )    Color: x / Appearance: x / SG: x / pH: x  Gluc: 99 mg/dL / Ketone: x  / Bili: x / Urobili: x   Blood: x / Protein: x / Nitrite: x   Leuk Esterase: x / RBC: x / WBC x   Sq Epi: x / Non Sq Epi: x / Bacteria: x        MICRODATA:      RADIOLOGY/OTHER STUDIES:

## 2023-10-11 NOTE — PROGRESS NOTE ADULT - ASSESSMENT
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 Sputum AFB smear is negativex3, culture negative x 4 weeks. CT scan reveals YARITZA and LLL interstitial lung disease. 9/15/23 pt underwent L VATS YARITZA and LLL wedge resection with Dr. Good. OR course uneventful and recovered post op without difficulty. Pt reports feeling some new L sided CP last week with some associated crackling. At his routine f/u appointment 10/6/23, CXR revealed large PTX and patient was sent to ER for further management. Left sided pigtail placed. 10/7/23 lung remains up on AM Xray. CT placed to water seal. 10/8/23 increased pneumo on morning CXR, placed back to suction at -40 per Dr. Good.  Today, still has residual apical and lateral pneumothorax on morning Xray while tube remains on waterseal.  With this, patient is hemodynamically stable and denies SOB or pleuritic CP. 10/10/23 s/p IR apical pigtail placement and doxy pleurodesis.     10/11 CXR stable. CT x 2 to remain to suction today at -40.    Plan:    Neurovascular:   -Pain well controlled with current regimen.   -PRN's: tylenol, oxy    Cardiovascular:   -Hemodynamically stable.   -Monitor: BP, HR, tele    Respiratory:   S/p L VATS YARITZA and LLL wedge resection 9/15   - 2 CT to suction (additional tube and doxycycline pleurodesis yesterday); keep to suction at -40 today   ILD  - c/w prednisone 1 mg QD  -Oxygenating well on room air  -Encourage continued use of IS 10x/hr and frequent ambulation  -CXR: Small left hydropneumothorax. Left chest tubes unchanged. No acute infiltrate appearing    GI:  -GI PPX: protonix   -PO Diet  -Bowel Regimen: miralax/senna/dulcolax      Renal / :  -Continue to monitor renal function: BUN/Cr: 0.90/16  -Monitor I/O's daily     Endocrine:    -No hx of DM or thyroid dx    Hematologic:  -no overt s/s of active bleeding   -CBC: H/H- 14.4/43.4  -Coagulation Panel.    ID:  -Temperature: afebrile   -CBC: WBC-no leukocytosis   -Continue to observe for SIRS/Sepsis Syndrome.    Prophylaxis:  -DVT prophylaxis with 5000 SubQ Heparin q8h.  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 Sputum AFB smear is negativex3, culture negative x 4 weeks. CT scan reveals YARITZA and LLL interstitial lung disease. 9/15/23 pt underwent L VATS YAIRTZA and LLL wedge resection with Dr. Good. OR course uneventful and recovered post op without difficulty. Pt reports feeling some new L sided CP last week with some associated crackling. At his routine f/u appointment 10/6/23, CXR revealed large PTX and patient was sent to ER for further management. Left sided pigtail placed. 10/7/23 lung remains up on AM Xray. CT placed to water seal. 10/8/23 increased pneumo on morning CXR, placed back to suction at -40 per Dr. Good.  Today, still has residual apical and lateral pneumothorax on morning Xray while tube remains on waterseal.  With this, patient is hemodynamically stable and denies SOB or pleuritic CP. 10/10/23 s/p IR apical pigtail placement and doxy pleurodesis.     10/11 CXR stable. CT x 2 to remain to suction today at -40. Tachycardic- gentle IVF, lopressor 12.5mg BID, pain control     Plan:    Neurovascular:   -Pain well controlled with current regimen.   -PRN's: tylenol, oxy    Cardiovascular:   Tachycardia (100-120's)   - likely multifactorial, gentle IVF, pain control, lopressor 12.5mg BID   -Hemodynamically stable.   -Monitor: BP, HR, tele    Respiratory:   S/p L VATS YARITZA and LLL wedge resection 9/15   - 2 CT to suction (additional tube and doxycycline pleurodesis yesterday); keep to suction at -40 today   ILD  - c/w prednisone 1 mg QD  -Oxygenating well on room air  -Encourage continued use of IS 10x/hr and frequent ambulation  -CXR: Small left hydropneumothorax. Left chest tubes unchanged. No acute infiltrate appearing    GI:  -GI PPX: protonix   -PO Diet  -Bowel Regimen: miralax/senna/dulcolax      Renal / :  -Continue to monitor renal function: BUN/Cr: 0.90/16  -Monitor I/O's daily     Endocrine:    -No hx of DM or thyroid dx    Hematologic:  -no overt s/s of active bleeding   -CBC: H/H- 14.4/43.4  -Coagulation Panel.    ID:  -Temperature: afebrile   -CBC: WBC-no leukocytosis   -Continue to observe for SIRS/Sepsis Syndrome.    Prophylaxis:  -DVT prophylaxis with 5000 SubQ Heparin q8h.  -Continue with SCD's b/l while patient is at rest     Disposition:  CT x 2 to remain to suction today at -40   Tachycardic- gentle IVF, lopressor 12.5mg BID, pain control   Plan for discharge home when medically stable

## 2023-10-11 NOTE — DIETITIAN INITIAL EVALUATION ADULT - OTHER CALCULATIONS
Estimated needs based on IBW 68.4kg; calorie/protein needs adjusted for clinical course, post-op demands; fluid per team

## 2023-10-11 NOTE — DIETITIAN INITIAL EVALUATION ADULT - NSFNSGIIOFT_GEN_A_CORE
10-10-23 @ 07:01  -  10-11-23 @ 07:00  --------------------------------------------------------  OUT:    Chest Tube (mL): 50 mL    Chest Tube (mL): 150 mL  Total OUT: 200 mL    Total NET: -200 mL      10-11-23 @ 07:01  -  10-11-23 @ 17:04  --------------------------------------------------------  OUT:    Chest Tube (mL): 13 mL    Chest Tube (mL): 49 mL  Total OUT: 62 mL    Total NET: -62 mL

## 2023-10-11 NOTE — DIETITIAN INITIAL EVALUATION ADULT - PERTINENT LABORATORY DATA
10-11    138  |  101  |  21  ----------------------------<  128<H>  4.4   |  27  |  1.02    Ca    9.5      11 Oct 2023 16:04  Mg     2.3     10-10

## 2023-10-11 NOTE — DIETITIAN INITIAL EVALUATION ADULT - OTHER INFO
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 CT scan reveals YARITZA and LLL interstitial lung disease. 9/15/23 pt underwent L VATS YARITZA and LLL wedge resection with Dr. Good. Pt reports feeling some new L sided CP last week with some associated crackling. At his routine f/u appointment 10/6/23, CXR revealed large PTX and patient was sent to ER for further management. Left sided pigtail placed. 10/7/23 lung remains up on AM Xray. CT placed to water seal. 10/8/23 increased pneumo on morning CXR, placed back to suction at -40 per Dr. Good. 10/10/23 s/p IR apical pigtail placement and doxy pleurodesis.     .....  Pt started on regular diet post-op. Intermittent pain noted. Galen score 20. Chest tube in place, surgical incisions noted. RD to follow, nutrition recommendations below. 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 CT scan reveals YARITZA and LLL interstitial lung disease. 9/15/23 pt underwent L VATS YARITZA and LLL wedge resection with Dr. Good. Pt reports feeling some new L sided CP last week with some associated crackling. At his routine f/u appointment 10/6/23, CXR revealed large PTX and patient was sent to ER for further management. Left sided pigtail placed. 10/7/23 lung remains up on AM Xray. CT placed to water seal. 10/8/23 increased pneumo on morning CXR, placed back to suction at -40 per Dr. Good. 10/10/23 s/p IR apical pigtail placement and doxy pleurodesis.     Pt assessed at bedside, resting in bed.  Pt started on regular diet post-op. Fair intake post op hindered by intermittent pain. Pt presents thin, BMI 19.1 however states this is baseline, no reports of intake or weight changes PTA. Reviewed adequate intake goals, increased calorie/protein needs. Diet preferences reviewed. Galen score 20. Chest tube in place, surgical incisions noted. RD to follow, nutrition recommendations below.

## 2023-10-11 NOTE — PROGRESS NOTE ADULT - SUBJECTIVE AND OBJECTIVE BOX
Patient discussed on morning rounds with Dr. Good     OPERATION & DATE: L VATS YARITZA and LLL wedge resection on 09/15/2023    SUBJECTIVE ASSESSMENT: Seen accompanied by daughter. Mild incisional site pain. Denies chest pain, SOB, N/V/D.     VITAL SIGNS:  Vital Signs Last 24 Hrs  T(C): 36.8 (11 Oct 2023 10:03), Max: 36.8 (11 Oct 2023 10:03)  T(F): 98.3 (11 Oct 2023 10:03), Max: 98.3 (11 Oct 2023 10:03)  HR: 88 (11 Oct 2023 08:36) (72 - 88)  BP: 106/66 (11 Oct 2023 08:36) (104/65 - 106/66)  BP(mean): 82 (11 Oct 2023 08:36) (80 - 83)  RR: 15 (11 Oct 2023 08:36) (15 - 18)  SpO2: 93% (11 Oct 2023 08:36) (93% - 97%)    Parameters below as of 11 Oct 2023 08:36  Patient On (Oxygen Delivery Method): room air    I&O's Detail    10 Oct 2023 07:01  -  11 Oct 2023 07:00  --------------------------------------------------------  IN:    Oral Fluid: 150 mL  Total IN: 150 mL    OUT:    Chest Tube (mL): 50 mL    Chest Tube (mL): 150 mL  Total OUT: 200 mL    Total NET: -50 mL    11 Oct 2023 07:01  -  11 Oct 2023 12:59  --------------------------------------------------------  IN:  Total IN: 0 mL    OUT:    Chest Tube (mL): 0 mL    Chest Tube (mL): 10 mL  Total OUT: 10 mL    Total NET: -10 mL    CHEST TUBE: y   MISHA DRAIN: n   EPICARDIAL WIRES: n  STITCHES:n  STAPLES:n  JOEL: n  CENTRAL LINE:n  MIDLINE/PICC:n  WOUND VAC:n    PHYSICAL EXAM:  General: NAD, sitting comfortably in chair, conversing appropriately  Neurological: alert and oriented, UE and LE strength equal b/l, facial symmetry present  Cardiovascular: RRR, Clear S1 and S2, no murmurs appreciated  Respiratory: chest expansion symmetrical, CTA b/l, no wheezing noted  Gastrointestinal: +BS, soft, NT, ND  Extremities: moving spontaneously, no calf tenderness or edema.  Vascular: warm, well perfused. DP/PT pulses palpable b/l.  Incisions: VATS incisions C/D/I     LABS:                        14.4   6.46  )-----------( 164      ( 10 Oct 2023 05:30 )             43.4     10-10    142  |  106  |  16  ----------------------------<  99  4.3   |  28  |  0.90    Ca    9.6      10 Oct 2023 05:30  Mg     2.3     10-10    Urinalysis Basic - ( 10 Oct 2023 05:30 )    Color: x / Appearance: x / SG: x / pH: x  Gluc: 99 mg/dL / Ketone: x  / Bili: x / Urobili: x   Blood: x / Protein: x / Nitrite: x   Leuk Esterase: x / RBC: x / WBC x   Sq Epi: x / Non Sq Epi: x / Bacteria: x    MEDICATIONS  (STANDING):  bisacodyl Suppository 10 milliGRAM(s) Rectal once  heparin   Injectable 5000 Unit(s) SubCutaneous every 8 hours  influenza   Vaccine 0.5 milliLiter(s) IntraMuscular once  pantoprazole    Tablet 40 milliGRAM(s) Oral before breakfast  polyethylene glycol 3350 17 Gram(s) Oral daily  potassium chloride   Powder 40 milliEquivalent(s) Oral once  predniSONE   Tablet 10 milliGRAM(s) Oral daily  senna 2 Tablet(s) Oral at bedtime  sodium chloride 0.9% lock flush 3 milliLiter(s) IV Push every 8 hours    MEDICATIONS  (PRN):  acetaminophen Tablet .. 650 milliGRAM(s) Oral every 6 hours PRN Moderate Pain (4 - 6)  oxyCODONE IR 5 milliGRAM(s) Oral every 8 hours PRN Moderate Pain (4 - 6)    RADIOLOGY & ADDITIONAL TESTS:  CXR  Small left hydropneumothorax. Left chest tubes unchanged. No acute infiltrate appearing.

## 2023-10-11 NOTE — DIETITIAN INITIAL EVALUATION ADULT - ADD RECOMMEND
1. Continue regular diet  --Recommend add ensure enlive BID  --Recommend add MVI w. minerals  2. Follow intake closely, adjust prn  3. Monitor electrolytes, adjust and replete prn  4. Pain and bowel regimen per team   5. RD diet edu prn

## 2023-10-11 NOTE — DIETITIAN INITIAL EVALUATION ADULT - PERTINENT MEDS FT
MEDICATIONS  (STANDING):  heparin   Injectable 5000 Unit(s) SubCutaneous every 8 hours  influenza   Vaccine 0.5 milliLiter(s) IntraMuscular once  lactated ringers. 500 milliLiter(s) (50 mL/Hr) IV Continuous <Continuous>  metoprolol tartrate 12.5 milliGRAM(s) Oral every 12 hours  pantoprazole    Tablet 40 milliGRAM(s) Oral before breakfast  polyethylene glycol 3350 17 Gram(s) Oral daily  potassium chloride   Powder 40 milliEquivalent(s) Oral once  predniSONE   Tablet 10 milliGRAM(s) Oral daily  senna 2 Tablet(s) Oral at bedtime  sodium chloride 0.9% lock flush 3 milliLiter(s) IV Push every 8 hours    MEDICATIONS  (PRN):  acetaminophen     Tablet .. 650 milliGRAM(s) Oral every 6 hours PRN Moderate Pain (4 - 6)  oxyCODONE    IR 5 milliGRAM(s) Oral every 8 hours PRN Moderate Pain (4 - 6)

## 2023-10-11 NOTE — PROGRESS NOTE ADULT - ASSESSMENT
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 Sputum AFB smear is negativex3, culture negative x 4 weeks. CT scan reveals YARITZA and LLL interstitial lung disease. 9/15/23 pt underwent L VATS YARITZA and LLL wedge resection with Dr. Good. OR course was uneventful and recovered post op without difficulty. 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 Pt reports feeling some new L sided CP last week with some associated crackling. At his routine f/u appointment 10/6/23, CXR revealed large PTX and patient was sent to ER for further management. Left sided pigtail placed. 10/7/23 lung remains up on AM Xray. CT placed to water seal. 10/8/23 increased pneumo on morning CXR, placed back to suction at -40 per Dr. Good.  Today, still has residual apical and lateral pneumothorax on morning Xray while tube remains on water seal, no air leak. IR consulted for left apical chest tube placement. and CTS plan on doxycycline pleurodesis as d/w pt, daughter and CTS team Dr. Polo this am.    - active care per CTS team, Dr Valiente   - IR consult appreciated  - s/p L apical chest tube placement by IR ( 10/10)   - s/p Doxycycline pleurodesis (10/10)  - CXR reviewed- residual small L apical hydropneumothorax   - pain management   lidocaine 1% Injectable 20 milliLiter(s) Local Injection once  lidocaine 2% Injectable 5 milliLiter(s) Local Injection once  acetaminophen     Tablet .. 650 milliGRAM(s) Oral every 6 hours PRN Moderate Pain (4 - 6)  oxyCODONE    IR 5 milliGRAM(s) Oral every 8 hours PRN Moderate Pain (4 - 6)  bowel regimen:   polyethylene glycol 3350 17 Gram(s) Oral daily  senna 2 Tablet(s) Oral at bedtime  - ILD: predniSONE   Tablet 1 milliGRAM(s) Oral daily ?? ( Pt was taking Prednisone 20mg daily at home)   - GI ppx: pantoprazole    Tablet 40 milliGRAM(s) Oral before breakfast  - DVT ppx: heparin   Injectable 5000 Unit(s) SubCutaneous every 8 hours  - IS at bedside     Contacts:  PMD: Dr. Jamarcus Gordon   Pulm: Dr. Teo Álvarez   Daughter: Lynn Hiram Evan [phone]    POC as d/w CTS Dr. Tianna Polo and CTS team

## 2023-10-12 LAB
ANION GAP SERPL CALC-SCNC: 7 MMOL/L — SIGNIFICANT CHANGE UP (ref 5–17)
BUN SERPL-MCNC: 22 MG/DL — SIGNIFICANT CHANGE UP (ref 7–23)
CALCIUM SERPL-MCNC: 9.1 MG/DL — SIGNIFICANT CHANGE UP (ref 8.4–10.5)
CHLORIDE SERPL-SCNC: 105 MMOL/L — SIGNIFICANT CHANGE UP (ref 96–108)
CO2 SERPL-SCNC: 27 MMOL/L — SIGNIFICANT CHANGE UP (ref 22–31)
CREAT SERPL-MCNC: 0.89 MG/DL — SIGNIFICANT CHANGE UP (ref 0.5–1.3)
EGFR: 103 ML/MIN/1.73M2 — SIGNIFICANT CHANGE UP
GLUCOSE SERPL-MCNC: 112 MG/DL — HIGH (ref 70–99)
HCT VFR BLD CALC: 40.7 % — SIGNIFICANT CHANGE UP (ref 39–50)
HGB BLD-MCNC: 13.9 G/DL — SIGNIFICANT CHANGE UP (ref 13–17)
MAGNESIUM SERPL-MCNC: 2 MG/DL — SIGNIFICANT CHANGE UP (ref 1.6–2.6)
MCHC RBC-ENTMCNC: 31.5 PG — SIGNIFICANT CHANGE UP (ref 27–34)
MCHC RBC-ENTMCNC: 34.2 GM/DL — SIGNIFICANT CHANGE UP (ref 32–36)
MCV RBC AUTO: 92.3 FL — SIGNIFICANT CHANGE UP (ref 80–100)
NRBC # BLD: 0 /100 WBCS — SIGNIFICANT CHANGE UP (ref 0–0)
PLATELET # BLD AUTO: 156 K/UL — SIGNIFICANT CHANGE UP (ref 150–400)
POTASSIUM SERPL-MCNC: 3.9 MMOL/L — SIGNIFICANT CHANGE UP (ref 3.5–5.3)
POTASSIUM SERPL-SCNC: 3.9 MMOL/L — SIGNIFICANT CHANGE UP (ref 3.5–5.3)
RBC # BLD: 4.41 M/UL — SIGNIFICANT CHANGE UP (ref 4.2–5.8)
RBC # FLD: 12 % — SIGNIFICANT CHANGE UP (ref 10.3–14.5)
SODIUM SERPL-SCNC: 139 MMOL/L — SIGNIFICANT CHANGE UP (ref 135–145)
WBC # BLD: 7.44 K/UL — SIGNIFICANT CHANGE UP (ref 3.8–10.5)
WBC # FLD AUTO: 7.44 K/UL — SIGNIFICANT CHANGE UP (ref 3.8–10.5)

## 2023-10-12 PROCEDURE — ZZZZZ: CPT

## 2023-10-12 PROCEDURE — 71045 X-RAY EXAM CHEST 1 VIEW: CPT | Mod: 26

## 2023-10-12 PROCEDURE — 99232 SBSQ HOSP IP/OBS MODERATE 35: CPT

## 2023-10-12 RX ORDER — POTASSIUM CHLORIDE 20 MEQ
40 PACKET (EA) ORAL ONCE
Refills: 0 | Status: COMPLETED | OUTPATIENT
Start: 2023-10-12 | End: 2023-10-12

## 2023-10-12 RX ORDER — ACETAMINOPHEN 500 MG
1000 TABLET ORAL ONCE
Refills: 0 | Status: COMPLETED | OUTPATIENT
Start: 2023-10-12 | End: 2023-10-12

## 2023-10-12 RX ORDER — MAGNESIUM OXIDE 400 MG ORAL TABLET 241.3 MG
400 TABLET ORAL ONCE
Refills: 0 | Status: COMPLETED | OUTPATIENT
Start: 2023-10-12 | End: 2023-10-12

## 2023-10-12 RX ORDER — LIDOCAINE 4 G/100G
1 CREAM TOPICAL ONCE
Refills: 0 | Status: COMPLETED | OUTPATIENT
Start: 2023-10-12 | End: 2023-10-12

## 2023-10-12 RX ORDER — MORPHINE SULFATE 50 MG/1
2 CAPSULE, EXTENDED RELEASE ORAL ONCE
Refills: 0 | Status: DISCONTINUED | OUTPATIENT
Start: 2023-10-12 | End: 2023-10-12

## 2023-10-12 RX ORDER — KETOROLAC TROMETHAMINE 30 MG/ML
30 SYRINGE (ML) INJECTION ONCE
Refills: 0 | Status: DISCONTINUED | OUTPATIENT
Start: 2023-10-12 | End: 2023-10-12

## 2023-10-12 RX ADMIN — MORPHINE SULFATE 2 MILLIGRAM(S): 50 CAPSULE, EXTENDED RELEASE ORAL at 12:41

## 2023-10-12 RX ADMIN — OXYCODONE HYDROCHLORIDE 5 MILLIGRAM(S): 5 TABLET ORAL at 10:30

## 2023-10-12 RX ADMIN — POLYETHYLENE GLYCOL 3350 17 GRAM(S): 17 POWDER, FOR SOLUTION ORAL at 11:32

## 2023-10-12 RX ADMIN — Medication 40 MILLIEQUIVALENT(S): at 09:31

## 2023-10-12 RX ADMIN — Medication 10 MILLIGRAM(S): at 06:06

## 2023-10-12 RX ADMIN — Medication 400 MILLIGRAM(S): at 11:49

## 2023-10-12 RX ADMIN — OXYCODONE HYDROCHLORIDE 5 MILLIGRAM(S): 5 TABLET ORAL at 09:31

## 2023-10-12 RX ADMIN — MORPHINE SULFATE 2 MILLIGRAM(S): 50 CAPSULE, EXTENDED RELEASE ORAL at 12:25

## 2023-10-12 RX ADMIN — MORPHINE SULFATE 2 MILLIGRAM(S): 50 CAPSULE, EXTENDED RELEASE ORAL at 12:40

## 2023-10-12 RX ADMIN — LIDOCAINE 1 PATCH: 4 CREAM TOPICAL at 11:49

## 2023-10-12 RX ADMIN — MAGNESIUM OXIDE 400 MG ORAL TABLET 400 MILLIGRAM(S): 241.3 TABLET ORAL at 09:31

## 2023-10-12 RX ADMIN — LIDOCAINE 1 PATCH: 4 CREAM TOPICAL at 19:33

## 2023-10-12 RX ADMIN — Medication 30 MILLIGRAM(S): at 13:45

## 2023-10-12 RX ADMIN — MORPHINE SULFATE 2 MILLIGRAM(S): 50 CAPSULE, EXTENDED RELEASE ORAL at 12:57

## 2023-10-12 RX ADMIN — Medication 500 MILLIGRAM(S): at 12:38

## 2023-10-12 RX ADMIN — SODIUM CHLORIDE 3 MILLILITER(S): 9 INJECTION INTRAMUSCULAR; INTRAVENOUS; SUBCUTANEOUS at 22:42

## 2023-10-12 RX ADMIN — Medication 12.5 MILLIGRAM(S): at 06:06

## 2023-10-12 RX ADMIN — Medication 1000 MILLIGRAM(S): at 12:05

## 2023-10-12 RX ADMIN — HEPARIN SODIUM 5000 UNIT(S): 5000 INJECTION INTRAVENOUS; SUBCUTANEOUS at 06:06

## 2023-10-12 RX ADMIN — PANTOPRAZOLE SODIUM 40 MILLIGRAM(S): 20 TABLET, DELAYED RELEASE ORAL at 06:06

## 2023-10-12 RX ADMIN — SODIUM CHLORIDE 3 MILLILITER(S): 9 INJECTION INTRAMUSCULAR; INTRAVENOUS; SUBCUTANEOUS at 06:27

## 2023-10-12 RX ADMIN — SODIUM CHLORIDE 3 MILLILITER(S): 9 INJECTION INTRAMUSCULAR; INTRAVENOUS; SUBCUTANEOUS at 14:47

## 2023-10-12 RX ADMIN — Medication 30 MILLIGRAM(S): at 13:30

## 2023-10-12 RX ADMIN — SENNA PLUS 2 TABLET(S): 8.6 TABLET ORAL at 22:29

## 2023-10-12 RX ADMIN — HEPARIN SODIUM 5000 UNIT(S): 5000 INJECTION INTRAVENOUS; SUBCUTANEOUS at 22:28

## 2023-10-12 NOTE — PROGRESS NOTE ADULT - SUBJECTIVE AND OBJECTIVE BOX
Patient discussed on morning rounds with Dr. Polo    Operation / Date:    9/15/23 L VATS YARITZA and LLL wedge resection   10/6/23 readmit w/ large left sided pneumothorax - pigtail placed  10/10/23 Additional left pigtail placed by IR for residual pneumothorax and air leak      SUBJECTIVE ASSESSMENT:  Translated by daughter at bedside.  Pt feels well.  He is ambulating in the hallway, eating w/o issues.  No pain or SOB.  Overall, Denies CP/SOB/N/V/D/dizziness/cough/fever/chills.      Vital Signs Last 24 Hrs  T(C): 36.6 (12 Oct 2023 05:01), Max: 36.8 (11 Oct 2023 10:03)  T(F): 97.8 (12 Oct 2023 05:01), Max: 98.3 (11 Oct 2023 10:03)  HR: 68 (12 Oct 2023 05:00) (66 - 108)  BP: 105/55 (12 Oct 2023 05:00) (95/52 - 105/55)  BP(mean): 77 (12 Oct 2023 05:00) (68 - 84)  RR: 16 (12 Oct 2023 05:00) (15 - 16)  SpO2: 95% (12 Oct 2023 05:00) (92% - 95%)    Parameters below as of 12 Oct 2023 05:00  Patient On (Oxygen Delivery Method): room air      I&O's Detail    11 Oct 2023 07:01  -  12 Oct 2023 07:00  --------------------------------------------------------  IN:    Lactated Ringers: 498 mL    Oral Fluid: 200 mL  Total IN: 698 mL    OUT:    Chest Tube (mL): 60 mL    Chest Tube (mL): 70 mL  Total OUT: 130 mL    Total NET: 568 mL        CHEST TUBE:  Yes 2 left side pigtails.  Intermittent air leaks.  Both on suction    PHYSICAL EXAM:    GEN: NAD, looks comfortable  Psych: Mood appropriate  Neuro: A&Ox3.  No focal deficits.  Moving all extremities.   HEENT: No obvious abnormalities  CV: S1S2, regular, no murmurs appreciated.  No carotid bruits.  No JVD  Lungs: Clear B/L.  No wheezing, rales or rhonchi  ABD: Soft, non-tender, non-distended.  +Bowel sounds  EXT: Warm and well perfused.  No peripheral edema noted  Musculoskeletal: Moving all extremities with normal ROM, no joint swelling  PV: Pedal pulses palpable  Incision Sites: CTs both secure.  Old VATS incisions healing w/o redness or drainage.     LABS:                        13.9   7.44  )-----------( 156      ( 12 Oct 2023 05:30 )             40.7        10-12    139  |  105  |  22  ----------------------------<  112<H>  3.9   |  27  |  0.89    Ca    9.1      12 Oct 2023 05:30  Mg     2.0     10-12        Urinalysis Basic - ( 12 Oct 2023 05:30 )    Color: x / Appearance: x / SG: x / pH: x  Gluc: 112 mg/dL / Ketone: x  / Bili: x / Urobili: x   Blood: x / Protein: x / Nitrite: x   Leuk Esterase: x / RBC: x / WBC x   Sq Epi: x / Non Sq Epi: x / Bacteria: x        MEDICATIONS  (STANDING):  heparin   Injectable 5000 Unit(s) SubCutaneous every 8 hours  influenza   Vaccine 0.5 milliLiter(s) IntraMuscular once  lactated ringers. 500 milliLiter(s) (50 mL/Hr) IV Continuous <Continuous>  magnesium oxide 400 milliGRAM(s) Oral once  metoprolol tartrate 12.5 milliGRAM(s) Oral every 12 hours  pantoprazole    Tablet 40 milliGRAM(s) Oral before breakfast  polyethylene glycol 3350 17 Gram(s) Oral daily  potassium chloride    Tablet ER 40 milliEquivalent(s) Oral once  potassium chloride   Powder 40 milliEquivalent(s) Oral once  predniSONE   Tablet 10 milliGRAM(s) Oral daily  senna 2 Tablet(s) Oral at bedtime  sodium chloride 0.9% lock flush 3 milliLiter(s) IV Push every 8 hours    MEDICATIONS  (PRN):  acetaminophen     Tablet .. 650 milliGRAM(s) Oral every 6 hours PRN Moderate Pain (4 - 6)  oxyCODONE    IR 5 milliGRAM(s) Oral every 8 hours PRN Moderate Pain (4 - 6)        RADIOLOGY & ADDITIONAL TESTS:

## 2023-10-12 NOTE — PROGRESS NOTE ADULT - ASSESSMENT
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 Sputum AFB smear is negativex3, culture negative x 4 weeks. CT scan reveals YARITZA and LLL interstitial lung disease. 9/15/23 pt underwent L VATS YARITZA and LLL wedge resection with Dr. Good. OR course uneventful and recovered post op without difficulty. Pt reports feeling some new L sided CP last week with some associated crackling. At his routine f/u appointment 10/6/23, CXR revealed large PTX and patient was sent to ER for further management. Left sided pigtail placed. 10/7/23 lung remains up on AM Xray. CT placed to water seal. 10/8/23 increased pneumo on morning CXR, placed back to suction at -40 per Dr. Good.  Today, still has residual apical and lateral pneumothorax on morning Xray while tube remains on waterseal.  With this, patient is hemodynamically stable and denies SOB or pleuritic CP. 10/10/23 s/p IR apical pigtail placement and doxy pleurodesis.      10/11 CXR stable. CT x 2 to remain to suction today at -40. Tachycardic- gentle IVF, lopressor 12.5mg BID, pain control     Plan:    Neurovascular:   -Pain well controlled with current regimen.   -PRN's: tylenol, oxy    Cardiovascular:   Tachycardia (100-120's)   - likely multifactorial, gentle IVF, pain control, lopressor 12.5mg BID   -Hemodynamically stable.   -Monitor: BP, HR, tele    Respiratory:   S/p L VATS YARITZA and LLL wedge resection 9/15   - 2 CT to suction (additional tube and doxycycline pleurodesis yesterday); keep to suction at -40 today   ILD  - c/w prednisone 1 mg QD  -Oxygenating well on room air  -Encourage continued use of IS 10x/hr and frequent ambulation  -CXR: Small left hydropneumothorax. Left chest tubes unchanged. No acute infiltrate appearing    GI:  -GI PPX: protonix   -PO Diet  -Bowel Regimen: miralax/senna/dulcolax      Renal / :  -Continue to monitor renal function: BUN/Cr: 0.90/16  -Monitor I/O's daily     Endocrine:    -No hx of DM or thyroid dx    Hematologic:  -no overt s/s of active bleeding   -CBC: H/H- 14.4/43.4  -Coagulation Panel.    ID:  -Temperature: afebrile   -CBC: WBC-no leukocytosis   -Continue to observe for SIRS/Sepsis Syndrome.    Prophylaxis:  -DVT prophylaxis with 5000 SubQ Heparin q8h.  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 Sputum AFB smear is negativex3, culture negative x 4 weeks. CT scan reveals YARITZA and LLL interstitial lung disease. 9/15/23 pt underwent L VATS YARITZA and LLL wedge resection with Dr. Good. OR course uneventful and recovered post op without difficulty. Pt reports feeling some new L sided CP last week with some associated crackling. At his routine f/u appointment 10/6/23, CXR revealed large PTX and patient was sent to ER for further management. Left sided pigtail placed. 10/7/23 lung remains up on AM Xray. CT placed to water seal. 10/8/23 increased pneumo on morning CXR, placed back to suction at -40 per Dr. Good.  Today, still has residual apical and lateral pneumothorax on morning Xray while tube remains on waterseal.  With this, patient is hemodynamically stable and denies SOB or pleuritic CP. 10/10/23 s/p IR apical pigtail placement and doxy pleurodesis.  10/11/23 CXR stable. CT x 2 to remain to suction at -40. Tachycardic- gentle IVF, lopressor 12.5mg BID- tachycardia improved.  Today 10/12/23, pt seen by Dr. Polo.  IR tube from 10/10/23 w/ small intermittent 1/5 air leak.  Tube from 10/9/23 w/o leak.  Plan is for repeat doxy pleurodesis today.  Pt agrees.    Plan:    Neurovascular:   -Pain well controlled with current regimen.  **Pre medicate for doxy**  -PRN's: tylenol, oxycodone    Cardiovascular:   Tachycardia (100-120's) yesterday- improved   -Hemodynamically stable.     Respiratory:   S/p L VATS YARITZA and LLL wedge resection 9/15/23  - 2 CT to suction keep to suction at -40 today - repeat doxy pleurodesis today (10/12/23)  ILD  - c/w prednisone 1 mg QD  -Oxygenating well on room air  -Encouraged continued use of IS 10x/hr and frequent ambulation  -CXR: Small left hydropneumothorax. Left chest tubes unchanged  ILD findings of B/L lungs L>R    GI:  -GI PPX: protonix   -PO Diet  -Bowel Regimen: miralax/senna/dulcolax      Renal / :  -Stable BUN/creatinine  -Monitor I/O's daily     Endocrine:    -No hx of DM or thyroid dx    Hematologic:  -no overt s/s of active bleeding   -CBC: H/H stable    ID:  -Temperature: afebrile   -CBC: WBC-no leukocytosis     Prophylaxis:  -DVT prophylaxis with 5000 SubQ Heparin q8h.  -Continue with SCD's b/l while patient is at rest     Disposition:  Pending CT management

## 2023-10-12 NOTE — PROGRESS NOTE ADULT - SUBJECTIVE AND OBJECTIVE BOX
LETICIA CAMPBELL , [id number],  [facility] [address]    Time of encounter : 9 am , seen with CTS team Dr. Polo , daughter at bedside  seen sitting on bedside chair.  air leak on chest tube  pain at the chest tube site worse with movement and breathing.  discussed pleurodesis today.     T(C): 35.9 (10-12-23 @ 16:48), Max: 37.1 (10-12-23 @ 09:08)  HR: 72 (10-12-23 @ 12:33) (66 - 76)  BP: 113/65 (10-12-23 @ 12:33) (91/54 - 113/65)  RR: 19 (10-12-23 @ 12:33) (16 - 19)  SpO2: 98% (10-12-23 @ 12:33) (94% - 98%)    acetaminophen     Tablet .. 650 milliGRAM(s) Oral every 6 hours PRN  heparin   Injectable 5000 Unit(s) SubCutaneous every 8 hours  influenza   Vaccine 0.5 milliLiter(s) IntraMuscular once  lactated ringers. 500 milliLiter(s) IV Continuous <Continuous>  metoprolol tartrate 12.5 milliGRAM(s) Oral every 12 hours  oxyCODONE    IR 5 milliGRAM(s) Oral every 8 hours PRN  pantoprazole    Tablet 40 milliGRAM(s) Oral before breakfast  polyethylene glycol 3350 17 Gram(s) Oral daily  potassium chloride   Powder 40 milliEquivalent(s) Oral once  predniSONE   Tablet 10 milliGRAM(s) Oral daily  senna 2 Tablet(s) Oral at bedtime  sodium chloride 0.9% lock flush 3 milliLiter(s) IV Push every 8 hours      Physical Exam :    General exam :  saturating well on room air, speaking full sentence.  CVS : RRR no murmur,   Lungs : good air entry on right, two pigtail chest tube on left- reduced air entry.  Abdomen : BS present, soft, not tender, not distended.  Extremities: no edema, no tenderness.  Neuro : AAO x 3 non focal.  Skin : warm and dry.   :  no perez.      CBC Full  -  ( 12 Oct 2023 05:30 )  WBC Count : 7.44 K/uL  RBC Count : 4.41 M/uL  Hemoglobin : 13.9 g/dL  Hematocrit : 40.7 %  Platelet Count - Automated : 156 K/uL  Mean Cell Volume : 92.3 fl  Mean Cell Hemoglobin : 31.5 pg  Mean Cell Hemoglobin Concentration : 34.2 gm/dL  Auto Neutrophil # : x  Auto Lymphocyte # : x  Auto Monocyte # : x  Auto Eosinophil # : x  Auto Basophil # : x  Auto Neutrophil % : x  Auto Lymphocyte % : x  Auto Monocyte % : x  Auto Eosinophil % : x  Auto Basophil % : x        10-12    139  |  105  |  22  ----------------------------<  112<H>  3.9   |  27  |  0.89    Ca    9.1      12 Oct 2023 05:30  Mg     2.0     10-12      Daily     Daily   CAPILLARY BLOOD GLUCOSE          Urinalysis Basic - ( 12 Oct 2023 05:30 )    Color: x / Appearance: x / SG: x / pH: x  Gluc: 112 mg/dL / Ketone: x  / Bili: x / Urobili: x   Blood: x / Protein: x / Nitrite: x   Leuk Esterase: x / RBC: x / WBC x   Sq Epi: x / Non Sq Epi: x / Bacteria: x

## 2023-10-12 NOTE — PROGRESS NOTE ADULT - ASSESSMENT
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 Sputum AFB smear is negativex3, culture negative x 4 weeks. CT scan reveals YARITZA and LLL interstitial lung disease. 9/15/23 pt underwent L VATS YARITZA and LLL wedge resection with Dr. Good. OR course was uneventful and recovered post op without difficulty. 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 Pt reports feeling some new L sided CP last week with some associated crackling. At his routine f/u appointment 10/6/23, CXR revealed large PTX and patient was sent to ER for further management. Left sided pigtail placed. 10/7/23 lung remains up on AM Xray. CT placed to water seal. 10/8/23 increased pneumo on morning CXR, placed back to suction at -40 per Dr. Good.  Today, still has residual apical and lateral pneumothorax on morning Xray while tube remains on water seal, no air leak. IR consulted for left apical chest tube placement. and CTS plan on doxycycline pleurodesis as d/w pt, daughter and CTS team Dr. Polo this am.    - active care per CTS team, Dr Valiente   - IR consult appreciated  - s/p L apical chest tube placement by IR ( 10/10)   - s/p Doxycycline pleurodesis (10/10)  - plan for repeat Doxycylcine pleurodesis 11/12-   chest x ray reviewed.    - pain management     acetaminophen     Tablet .. 650 milliGRAM(s) Oral every 6 hours PRN Moderate Pain (4 - 6)  oxyCODONE    IR 5 milliGRAM(s) Oral every 8 hours PRN Moderate Pain (4 - 6)  bowel regimen:   polyethylene glycol 3350 17 Gram(s) Oral daily  senna 2 Tablet(s) Oral at bedtime  - ILD: predniSONE   Tablet 1 milliGRAM(s) Oral daily ?? ( Pt was taking Prednisone 20mg daily at home)   - GI ppx: pantoprazole    Tablet 40 milliGRAM(s) Oral before breakfast  - DVT ppx: heparin   Injectable 5000 Unit(s) SubCutaneous every 8 hours  - IS at bedside     Contacts:  PMD: Dr. Jamarcus Gordon   Pulm: Dr. Teo Álvarez   Daughter: Lynn Gordon [phone]    POC as d/w CTS Dr. Tianna Polo and CTS team   dw patient and daughter.

## 2023-10-13 PROCEDURE — 99232 SBSQ HOSP IP/OBS MODERATE 35: CPT

## 2023-10-13 PROCEDURE — 71045 X-RAY EXAM CHEST 1 VIEW: CPT | Mod: 26

## 2023-10-13 RX ORDER — PETROLATUM,WHITE
1 JELLY (GRAM) TOPICAL
Refills: 0 | Status: DISCONTINUED | OUTPATIENT
Start: 2023-10-13 | End: 2023-10-14

## 2023-10-13 RX ADMIN — Medication 650 MILLIGRAM(S): at 17:43

## 2023-10-13 RX ADMIN — SODIUM CHLORIDE 3 MILLILITER(S): 9 INJECTION INTRAMUSCULAR; INTRAVENOUS; SUBCUTANEOUS at 14:17

## 2023-10-13 RX ADMIN — HEPARIN SODIUM 5000 UNIT(S): 5000 INJECTION INTRAVENOUS; SUBCUTANEOUS at 21:45

## 2023-10-13 RX ADMIN — POLYETHYLENE GLYCOL 3350 17 GRAM(S): 17 POWDER, FOR SOLUTION ORAL at 10:30

## 2023-10-13 RX ADMIN — PANTOPRAZOLE SODIUM 40 MILLIGRAM(S): 20 TABLET, DELAYED RELEASE ORAL at 06:55

## 2023-10-13 RX ADMIN — OXYCODONE HYDROCHLORIDE 5 MILLIGRAM(S): 5 TABLET ORAL at 12:23

## 2023-10-13 RX ADMIN — Medication 12.5 MILLIGRAM(S): at 06:54

## 2023-10-13 RX ADMIN — Medication 650 MILLIGRAM(S): at 18:30

## 2023-10-13 RX ADMIN — LIDOCAINE 1 PATCH: 4 CREAM TOPICAL at 00:00

## 2023-10-13 RX ADMIN — Medication 1 APPLICATION(S): at 15:50

## 2023-10-13 RX ADMIN — SODIUM CHLORIDE 3 MILLILITER(S): 9 INJECTION INTRAMUSCULAR; INTRAVENOUS; SUBCUTANEOUS at 23:07

## 2023-10-13 RX ADMIN — HEPARIN SODIUM 5000 UNIT(S): 5000 INJECTION INTRAVENOUS; SUBCUTANEOUS at 15:45

## 2023-10-13 RX ADMIN — SENNA PLUS 2 TABLET(S): 8.6 TABLET ORAL at 21:44

## 2023-10-13 RX ADMIN — Medication 10 MILLIGRAM(S): at 06:54

## 2023-10-13 RX ADMIN — SODIUM CHLORIDE 3 MILLILITER(S): 9 INJECTION INTRAMUSCULAR; INTRAVENOUS; SUBCUTANEOUS at 07:01

## 2023-10-13 RX ADMIN — Medication 1 APPLICATION(S): at 17:44

## 2023-10-13 RX ADMIN — HEPARIN SODIUM 5000 UNIT(S): 5000 INJECTION INTRAVENOUS; SUBCUTANEOUS at 06:55

## 2023-10-13 RX ADMIN — OXYCODONE HYDROCHLORIDE 5 MILLIGRAM(S): 5 TABLET ORAL at 10:27

## 2023-10-13 NOTE — PROGRESS NOTE ADULT - ASSESSMENT
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 Sputum AFB smear is negativex3, culture negative x 4 weeks. CT scan reveals YARITZA and LLL interstitial lung disease. 9/15/23 pt underwent L VATS YARITZA and LLL wedge resection with Dr. Good. OR course was uneventful and recovered post op without difficulty. 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 Pt reports feeling some new L sided CP last week with some associated crackling. At his routine f/u appointment 10/6/23, CXR revealed large PTX and patient was sent to ER for further management. Left sided pigtail placed. 10/7/23 lung remains up on AM Xray. CT placed to water seal. 10/8/23 increased pneumo on morning CXR, placed back to suction at -40 per Dr. Good.  Today, still has residual apical and lateral pneumothorax on morning Xray while tube remains on water seal, no air leak. IR consulted for left apical chest tube placement. and CTS plan on doxycycline pleurodesis as d/w pt, daughter and CTS team Dr. Polo this am.    - active care per CTS team, Dr Valiente   - IR consult appreciated  - s/p L apical chest tube placement by IR ( 10/10)   - s/p Doxycycline pleurodesis (10/10)  - s/p repeat Doxycylcine pleurodesis 11/12-   chest x ray reviewed.  no more air leak  plan for chest tube for  water-seal after midnight     - pain management     acetaminophen     Tablet .. 650 milliGRAM(s) Oral every 6 hours PRN Moderate Pain (4 - 6)  oxyCODONE    IR 5 milliGRAM(s) Oral every 8 hours PRN Moderate Pain (4 - 6)- helping   bowel regimen:   polyethylene glycol 3350 17 Gram(s) Oral daily  senna 2 Tablet(s) Oral at bedtime  - ILD: predniSONE   Tablet 10 milliGRAM(s) Oral daily ?? ( Pt was taking Prednisone 20mg daily at home)   - GI ppx: pantoprazole    Tablet 40 milliGRAM(s) Oral before breakfast  - DVT ppx: heparin   Injectable 5000 Unit(s) SubCutaneous every 8 hours  - IS at bedside     Contacts:  PMD: Dr. Jamarcus Gordon   Pulm: Dr. Teo Álvarez   Daughter: Lynnscottie Gordon [phone]    POC as d/w CTS , RN  dw patient and daughter.

## 2023-10-13 NOTE — PROGRESS NOTE ADULT - ASSESSMENT
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 Sputum AFB smear is negativex3, culture negative x 4 weeks. CT scan reveals YARITZA and LLL interstitial lung disease. 9/15/23 pt underwent L VATS YARITZA and LLL wedge resection with Dr. Good. OR course uneventful and recovered post op without difficulty. Pt reports feeling some new L sided CP last week with some associated crackling. At his routine f/u appointment 10/6/23, CXR revealed large PTX and patient was sent to ER for further management. Left sided pigtail placed. 10/7/23 lung remains up on AM Xray. CT placed to water seal. 10/8/23 increased pneumo on morning CXR, placed back to suction at -40 per Dr. Good.  Today, still has residual apical and lateral pneumothorax on morning Xray while tube remains on waterseal.  With this, patient is hemodynamically stable and denies SOB or pleuritic CP. 10/10/23 s/p IR apical pigtail placement and doxy pleurodesis.  10/11/23 CXR stable. CT x 2 to remain to suction at -40. Tachycardic- gentle IVF, lopressor 12.5mg BID- tachycardia improved.  Today 10/12/23, pt seen by Dr. Polo.  IR tube from 10/10/23 w/ small intermittent 1/5 air leak.  Tube from 10/9/23 w/o leak,  repeat doxy pleurodesis preformed with success. On 10/13 pt remained stable, am cxr no ptx and both chest tubes to suction with no leak. plan for chest tubes to be placed to waterseal at midnight for possible removal on 10/14.       Plan:    Neurovascular:   -Pain well controlled with current regimen.   -PRN's: tylenol, oxycodone    Cardiovascular:   -Continue Metoprolol 12.5 BID   -Hemodynamically stable.     Respiratory:   #S/p L VATS YARITZA and LLL wedge resection 9/15/23  - 2 CT to suction keep to suction at -40   -continue on suction waterseal at midnight   #ILD  - c/w prednisone 1 mg QD  -Oxygenating well on room air  -Encouraged continued use of IS 10x/hr and frequent ambulation  -CXR: Small left hydropneumothorax. Left chest tubes unchanged  ILD findings of B/L lungs L>R    GI:  -GI PPX: protonix   -PO Diet  -Bowel Regimen: miralax/senna/dulcolax      Renal / :  -Stable BUN/creatinine: lab holiday   -Monitor I/O's daily     Endocrine:    -No hx of DM or thyroid dx    Hematologic:  -no overt s/s of active bleeding   -CBC: H/H lab holiday     ID:  -Temperature: afebrile   -CBC: WBC-no leukocytosis     Prophylaxis:  -DVT prophylaxis with 5000 SubQ Heparin q8h.  -Continue with SCD's b/l while patient is at rest     Disposition:  Pending CT management

## 2023-10-13 NOTE — PROGRESS NOTE ADULT - SUBJECTIVE AND OBJECTIVE BOX
LETICIA CAMPBELL , [id number],  [facility] [address]LA [address]    Time of encounter : 9:40 am.  daughter at bedside   sitting on bedside chair, tolerated diet , had bowel movement.  chest pain is less - felt oxycodone is helping   no more air leak- two chest tubes in place.    T(C): 36.4 (10-13-23 @ 14:47), Max: 36.4 (10-13-23 @ 10:23)  HR: 66 (10-13-23 @ 14:10) (66 - 78)  BP: 95/59 (10-13-23 @ 14:10) (91/52 - 104/53)  RR: 15 (10-13-23 @ 14:10) (14 - 16)  SpO2: 96% (10-13-23 @ 14:10) (92% - 96%)    acetaminophen     Tablet .. 650 milliGRAM(s) Oral every 6 hours PRN  AQUAPHOR (petrolatum Ointment) 1 Application(s) Topical two times a day  heparin   Injectable 5000 Unit(s) SubCutaneous every 8 hours  influenza   Vaccine 0.5 milliLiter(s) IntraMuscular once  lactated ringers. 500 milliLiter(s) IV Continuous <Continuous>  metoprolol tartrate 12.5 milliGRAM(s) Oral every 12 hours  oxyCODONE    IR 5 milliGRAM(s) Oral every 8 hours PRN  pantoprazole    Tablet 40 milliGRAM(s) Oral before breakfast  polyethylene glycol 3350 17 Gram(s) Oral daily  predniSONE   Tablet 10 milliGRAM(s) Oral daily  senna 2 Tablet(s) Oral at bedtime  sodium chloride 0.9% lock flush 3 milliLiter(s) IV Push every 8 hours      Physical Exam :    General exam : thin, saturating well on room air.  RRR  good air entry on right, two chest tube on left.  no edema on lower ext  neuro -non focal  no perez      CBC Full  -  ( 12 Oct 2023 05:30 )  WBC Count : 7.44 K/uL  RBC Count : 4.41 M/uL  Hemoglobin : 13.9 g/dL  Hematocrit : 40.7 %  Platelet Count - Automated : 156 K/uL  Mean Cell Volume : 92.3 fl  Mean Cell Hemoglobin : 31.5 pg  Mean Cell Hemoglobin Concentration : 34.2 gm/dL  Auto Neutrophil # : x  Auto Lymphocyte # : x  Auto Monocyte # : x  Auto Eosinophil # : x  Auto Basophil # : x  Auto Neutrophil % : x  Auto Lymphocyte % : x  Auto Monocyte % : x  Auto Eosinophil % : x  Auto Basophil % : x        10-12    139  |  105  |  22  ----------------------------<  112<H>  3.9   |  27  |  0.89    Ca    9.1      12 Oct 2023 05:30  Mg     2.0     10-12      Daily     Daily   CAPILLARY BLOOD GLUCOSE          Urinalysis Basic - ( 12 Oct 2023 05:30 )    Color: x / Appearance: x / SG: x / pH: x  Gluc: 112 mg/dL / Ketone: x  / Bili: x / Urobili: x   Blood: x / Protein: x / Nitrite: x   Leuk Esterase: x / RBC: x / WBC x   Sq Epi: x / Non Sq Epi: x / Bacteria: x

## 2023-10-13 NOTE — PROGRESS NOTE ADULT - SUBJECTIVE AND OBJECTIVE BOX
Patient discussed on morning rounds with Dr. Good      Operation / Date: 9/15/23 L VATS YARITZA and LLL wedge resection   10/6/23 readmit w/ large left sided pneumothorax - pigtail placed  10/10/23 Additional left pigtail placed by IR for residual pneumothorax and air leak    SUBJECTIVE ASSESSMENT:  52y Male seen and examined at bedside with  ID: [id number] utilized. Pt denies dizziness, vision changes, chest pain, palpitations, shortness of breath, cough, n/v/d, extremity swelling, calf tenderness.     Vital Signs Last 24 Hrs  T(C): 36.4 (13 Oct 2023 10:23), Max: 36.4 (13 Oct 2023 10:23)  T(F): 97.5 (13 Oct 2023 10:23), Max: 97.5 (13 Oct 2023 10:23)  HR: 68 (13 Oct 2023 10:15) (68 - 78)  BP: 103/58 (13 Oct 2023 10:15) (91/52 - 113/65)  BP(mean): 72 (13 Oct 2023 10:15) (67 - 83)  RR: 14 (13 Oct 2023 10:15) (14 - 19)  SpO2: 92% (13 Oct 2023 10:15) (92% - 98%)    Parameters below as of 13 Oct 2023 10:15  Patient On (Oxygen Delivery Method): room air    I&O's Detail    12 Oct 2023 07:01  -  13 Oct 2023 07:00  --------------------------------------------------------  IN:    Oral Fluid: 440 mL  Total IN: 440 mL    OUT:    Chest Tube (mL): 80 mL    Chest Tube (mL): 10 mL  Total OUT: 90 mL    Total NET: 350 mL    CHEST TUBE:  Yes x2 to suction no leak   MISHA DRAIN:  none   EPICARDIAL WIRES: none   TIE DOWNS: yes   JOEL: none     PHYSICAL EXAM:  GEN: NAD, looks comfortable  Psych: Mood appropriate  Neuro: A&Ox3.  No focal deficits.  Moving all extremities.   HEENT: No obvious abnormalities  CV: S1S2, regular, no murmurs appreciated.  No carotid bruits.  No JVD  Lungs: Clear B/L.  No wheezing, rales or rhonchi  ABD: Soft, non-tender, non-distended.   EXT: Warm and well perfused.  No peripheral edema noted  Musculoskeletal: Moving all extremities with normal ROM, no joint swelling  Incisions: VATs sites are clean dry and intact without drainage or bleeding noted, two chest tube sites are intact no airleak.     LABS:                        13.9   7.44  )-----------( 156      ( 12 Oct 2023 05:30 )             40.7     10-12    139  |  105  |  22  ----------------------------<  112<H>  3.9   |  27  |  0.89    Ca    9.1      12 Oct 2023 05:30  Mg     2.0     10-12    Urinalysis Basic - ( 12 Oct 2023 05:30 )  Color: x / Appearance: x / SG: x / pH: x  Gluc: 112 mg/dL / Ketone: x  / Bili: x / Urobili: x   Blood: x / Protein: x / Nitrite: x   Leuk Esterase: x / RBC: x / WBC x   Sq Epi: x / Non Sq Epi: x / Bacteria: x    MEDICATIONS  (STANDING):  heparin   Injectable 5000 Unit(s) SubCutaneous every 8 hours  influenza   Vaccine 0.5 milliLiter(s) IntraMuscular once  lactated ringers. 500 milliLiter(s) (50 mL/Hr) IV Continuous <Continuous>  metoprolol tartrate 12.5 milliGRAM(s) Oral every 12 hours  pantoprazole    Tablet 40 milliGRAM(s) Oral before breakfast  polyethylene glycol 3350 17 Gram(s) Oral daily  predniSONE   Tablet 10 milliGRAM(s) Oral daily  senna 2 Tablet(s) Oral at bedtime  sodium chloride 0.9% lock flush 3 milliLiter(s) IV Push every 8 hours    MEDICATIONS  (PRN):  acetaminophen     Tablet .. 650 milliGRAM(s) Oral every 6 hours PRN Moderate Pain (4 - 6)  oxyCODONE    IR 5 milliGRAM(s) Oral every 8 hours PRN Moderate Pain (4 - 6)    RADIOLOGY & ADDITIONAL TESTS:  < from: Xray Chest 1 View- PORTABLE-Routine (Xray Chest 1 View- PORTABLE-Routine in AM.) (10.12.23 @ 05:31) >  IMPRESSION:    Left pigtail chest tubes and lung infiltrates similar to prior exam   10/11/2023. Left pleural effusion decreased. No left pneumothorax   identified.    --- End of Report ---    < end of copied text >

## 2023-10-14 ENCOUNTER — TRANSCRIPTION ENCOUNTER (OUTPATIENT)
Age: 53
End: 2023-10-14

## 2023-10-14 VITALS
OXYGEN SATURATION: 98 % | RESPIRATION RATE: 16 BRPM | SYSTOLIC BLOOD PRESSURE: 96 MMHG | DIASTOLIC BLOOD PRESSURE: 54 MMHG | HEART RATE: 68 BPM

## 2023-10-14 LAB
ANION GAP SERPL CALC-SCNC: 8 MMOL/L — SIGNIFICANT CHANGE UP (ref 5–17)
BUN SERPL-MCNC: 19 MG/DL — SIGNIFICANT CHANGE UP (ref 7–23)
CALCIUM SERPL-MCNC: 9.3 MG/DL — SIGNIFICANT CHANGE UP (ref 8.4–10.5)
CHLORIDE SERPL-SCNC: 102 MMOL/L — SIGNIFICANT CHANGE UP (ref 96–108)
CO2 SERPL-SCNC: 29 MMOL/L — SIGNIFICANT CHANGE UP (ref 22–31)
CREAT SERPL-MCNC: 0.87 MG/DL — SIGNIFICANT CHANGE UP (ref 0.5–1.3)
CULTURE RESULTS: SIGNIFICANT CHANGE UP
EGFR: 104 ML/MIN/1.73M2 — SIGNIFICANT CHANGE UP
GLUCOSE SERPL-MCNC: 99 MG/DL — SIGNIFICANT CHANGE UP (ref 70–99)
HCT VFR BLD CALC: 40.9 % — SIGNIFICANT CHANGE UP (ref 39–50)
HGB BLD-MCNC: 13.5 G/DL — SIGNIFICANT CHANGE UP (ref 13–17)
MAGNESIUM SERPL-MCNC: 2.1 MG/DL — SIGNIFICANT CHANGE UP (ref 1.6–2.6)
MCHC RBC-ENTMCNC: 31.3 PG — SIGNIFICANT CHANGE UP (ref 27–34)
MCHC RBC-ENTMCNC: 33 GM/DL — SIGNIFICANT CHANGE UP (ref 32–36)
MCV RBC AUTO: 94.7 FL — SIGNIFICANT CHANGE UP (ref 80–100)
NRBC # BLD: 0 /100 WBCS — SIGNIFICANT CHANGE UP (ref 0–0)
PLATELET # BLD AUTO: 162 K/UL — SIGNIFICANT CHANGE UP (ref 150–400)
POTASSIUM SERPL-MCNC: 4.2 MMOL/L — SIGNIFICANT CHANGE UP (ref 3.5–5.3)
POTASSIUM SERPL-SCNC: 4.2 MMOL/L — SIGNIFICANT CHANGE UP (ref 3.5–5.3)
RBC # BLD: 4.32 M/UL — SIGNIFICANT CHANGE UP (ref 4.2–5.8)
RBC # FLD: 11.9 % — SIGNIFICANT CHANGE UP (ref 10.3–14.5)
SODIUM SERPL-SCNC: 139 MMOL/L — SIGNIFICANT CHANGE UP (ref 135–145)
SPECIMEN SOURCE: SIGNIFICANT CHANGE UP
WBC # BLD: 6.74 K/UL — SIGNIFICANT CHANGE UP (ref 3.8–10.5)
WBC # FLD AUTO: 6.74 K/UL — SIGNIFICANT CHANGE UP (ref 3.8–10.5)

## 2023-10-14 PROCEDURE — 83735 ASSAY OF MAGNESIUM: CPT

## 2023-10-14 PROCEDURE — 71045 X-RAY EXAM CHEST 1 VIEW: CPT | Mod: 26

## 2023-10-14 PROCEDURE — 71045 X-RAY EXAM CHEST 1 VIEW: CPT

## 2023-10-14 PROCEDURE — 85610 PROTHROMBIN TIME: CPT

## 2023-10-14 PROCEDURE — 85025 COMPLETE CBC W/AUTO DIFF WBC: CPT

## 2023-10-14 PROCEDURE — 80048 BASIC METABOLIC PNL TOTAL CA: CPT

## 2023-10-14 PROCEDURE — 85730 THROMBOPLASTIN TIME PARTIAL: CPT

## 2023-10-14 PROCEDURE — 99285 EMERGENCY DEPT VISIT HI MDM: CPT | Mod: 25

## 2023-10-14 PROCEDURE — 85027 COMPLETE CBC AUTOMATED: CPT

## 2023-10-14 PROCEDURE — 86900 BLOOD TYPING SEROLOGIC ABO: CPT

## 2023-10-14 PROCEDURE — 86901 BLOOD TYPING SEROLOGIC RH(D): CPT

## 2023-10-14 PROCEDURE — 86850 RBC ANTIBODY SCREEN: CPT

## 2023-10-14 PROCEDURE — C1729: CPT

## 2023-10-14 PROCEDURE — 99153 MOD SED SAME PHYS/QHP EA: CPT

## 2023-10-14 PROCEDURE — 32557 INSERT CATH PLEURA W/ IMAGE: CPT

## 2023-10-14 PROCEDURE — 36415 COLL VENOUS BLD VENIPUNCTURE: CPT

## 2023-10-14 PROCEDURE — 99152 MOD SED SAME PHYS/QHP 5/>YRS: CPT

## 2023-10-14 PROCEDURE — C1769: CPT

## 2023-10-14 PROCEDURE — 99232 SBSQ HOSP IP/OBS MODERATE 35: CPT

## 2023-10-14 RX ORDER — SENNA PLUS 8.6 MG/1
2 TABLET ORAL
Qty: 60 | Refills: 0
Start: 2023-10-14 | End: 2023-11-12

## 2023-10-14 RX ORDER — METOPROLOL TARTRATE 50 MG
0.5 TABLET ORAL
Qty: 30 | Refills: 0
Start: 2023-10-14 | End: 2023-11-12

## 2023-10-14 RX ORDER — OXYCODONE HYDROCHLORIDE 5 MG/1
1 TABLET ORAL
Qty: 21 | Refills: 0
Start: 2023-10-14 | End: 2023-10-20

## 2023-10-14 RX ORDER — PETROLATUM,WHITE
1 JELLY (GRAM) TOPICAL
Qty: 0 | Refills: 0 | DISCHARGE
Start: 2023-10-14

## 2023-10-14 RX ADMIN — SODIUM CHLORIDE 3 MILLILITER(S): 9 INJECTION INTRAMUSCULAR; INTRAVENOUS; SUBCUTANEOUS at 05:18

## 2023-10-14 RX ADMIN — Medication 1 APPLICATION(S): at 06:05

## 2023-10-14 RX ADMIN — PANTOPRAZOLE SODIUM 40 MILLIGRAM(S): 20 TABLET, DELAYED RELEASE ORAL at 06:03

## 2023-10-14 RX ADMIN — Medication 12.5 MILLIGRAM(S): at 06:04

## 2023-10-14 RX ADMIN — Medication 650 MILLIGRAM(S): at 10:04

## 2023-10-14 RX ADMIN — HEPARIN SODIUM 5000 UNIT(S): 5000 INJECTION INTRAVENOUS; SUBCUTANEOUS at 06:03

## 2023-10-14 RX ADMIN — OXYCODONE HYDROCHLORIDE 5 MILLIGRAM(S): 5 TABLET ORAL at 03:52

## 2023-10-14 RX ADMIN — Medication 650 MILLIGRAM(S): at 11:00

## 2023-10-14 RX ADMIN — Medication 10 MILLIGRAM(S): at 06:03

## 2023-10-14 RX ADMIN — OXYCODONE HYDROCHLORIDE 5 MILLIGRAM(S): 5 TABLET ORAL at 04:20

## 2023-10-14 NOTE — PROGRESS NOTE ADULT - SUBJECTIVE AND OBJECTIVE BOX
LETICIA CAMPBELL , [id number],  [facility] [address]LA [address]    Time of encounter : 9 am   pain much better controlled  no more air leak  tolerating diet, saturating well on room air.   seen with CTS team Dr. Huber    T(C): 36.7 (10-14-23 @ 09:15), Max: 37.1 (10-14-23 @ 06:06)  HR: 66 (10-14-23 @ 09:20) (58 - 76)  BP: 104/55 (10-14-23 @ 09:20) (95/59 - 108/58)  RR: 15 (10-14-23 @ 09:20) (15 - 18)  SpO2: 98% (10-14-23 @ 09:20) (93% - 98%)    acetaminophen     Tablet .. 650 milliGRAM(s) Oral every 6 hours PRN  AQUAPHOR (petrolatum Ointment) 1 Application(s) Topical two times a day  heparin   Injectable 5000 Unit(s) SubCutaneous every 8 hours  influenza   Vaccine 0.5 milliLiter(s) IntraMuscular once  lactated ringers. 500 milliLiter(s) IV Continuous <Continuous>  metoprolol tartrate 12.5 milliGRAM(s) Oral every 12 hours  oxyCODONE    IR 5 milliGRAM(s) Oral every 8 hours PRN  pantoprazole    Tablet 40 milliGRAM(s) Oral before breakfast  polyethylene glycol 3350 17 Gram(s) Oral daily  predniSONE   Tablet 10 milliGRAM(s) Oral daily  senna 2 Tablet(s) Oral at bedtime  sodium chloride 0.9% lock flush 3 milliLiter(s) IV Push every 8 hours      Physical Exam :    General exam :  thin, saturating well on room air.  RRR  two chest tube on left, moving good air on right lung, reduced air movement on left lung.  no edema noted on bilateral lower ext  neuro - aao x 3 non focal.     CBC Full  -  ( 14 Oct 2023 07:32 )  WBC Count : 6.74 K/uL  RBC Count : 4.32 M/uL  Hemoglobin : 13.5 g/dL  Hematocrit : 40.9 %  Platelet Count - Automated : 162 K/uL  Mean Cell Volume : 94.7 fl  Mean Cell Hemoglobin : 31.3 pg  Mean Cell Hemoglobin Concentration : 33.0 gm/dL  Auto Neutrophil # : x  Auto Lymphocyte # : x  Auto Monocyte # : x  Auto Eosinophil # : x  Auto Basophil # : x  Auto Neutrophil % : x  Auto Lymphocyte % : x  Auto Monocyte % : x  Auto Eosinophil % : x  Auto Basophil % : x        10-14    139  |  102  |  19  ----------------------------<  99  4.2   |  29  |  0.87    Ca    9.3      14 Oct 2023 07:32  Mg     2.1     10-14      Daily     Daily Weight in k.2 (14 Oct 2023 06:06)  CAPILLARY BLOOD GLUCOSE          Urinalysis Basic - ( 14 Oct 2023 07:32 )    Color: x / Appearance: x / SG: x / pH: x  Gluc: 99 mg/dL / Ketone: x  / Bili: x / Urobili: x   Blood: x / Protein: x / Nitrite: x   Leuk Esterase: x / RBC: x / WBC x   Sq Epi: x / Non Sq Epi: x / Bacteria: x

## 2023-10-14 NOTE — DISCHARGE NOTE PROVIDER - CARE PROVIDER_API CALL
Christ Good  [address] [address]  Phone: [phone]  Fax: (   )    -  Follow Up Time: 2 weeks

## 2023-10-14 NOTE — DISCHARGE NOTE NURSING/CASE MANAGEMENT/SOCIAL WORK - NSDCFUADDAPPT_GEN_ALL_CORE_FT
Regarding your follow up appointment, our office will call you with the scheduled dates and times, if you do not receive a phone call by 10/16 please call the office at [phone].

## 2023-10-14 NOTE — DISCHARGE NOTE NURSING/CASE MANAGEMENT/SOCIAL WORK - NSDCPEFALRISK_GEN_ALL_CORE
For information on Fall & Injury Prevention, visit: [email or web address] OR  [email or web address] OR  https://www.cdc.gov/steadi/patient.html

## 2023-10-14 NOTE — DISCHARGE NOTE PROVIDER - NSDCHC_MEDRECSTATUS_GEN_ALL_CORE
Hold coumadin-   Will explain risks/benefit  associated with stopping AC considering her h/o AFib and MVR Admission Reconciliation is Completed  Discharge Reconciliation is Completed s/p 4 PRBCs  keep Hb>8 in view of CAD  Hemoglobin: 9.0 g/dL  Hematocrit: 27.9 % follow up H/H as outpatient with PCP trend H/H q8h  keep Hb>8 in view of her cardiac history

## 2023-10-14 NOTE — CHART NOTE - NSCHARTNOTEFT_GEN_A_CORE
CT Removal x2:    Basilar first then apical     Pt seen and examined at bedside.  Case discussed with Dr. Vaca and is recommending removal of CT.  Minimal output from CT.  No air leak appreciated.  CT removed without incident.  Occlusive dressing placed. Follow up CXR with no obvious PTX noted.  Pt tolerated procedure well and remained hemodynamically stable.

## 2023-10-14 NOTE — DISCHARGE NOTE PROVIDER - HOSPITAL COURSE
Patient discussed on morning rounds with Dr. Vaca     Operation Date: 9/15/23 L VATS YARITZA and LLL wedge resection   10/6/23 readmit w/ large left sided pneumothorax - pigtail placed  10/10/23 Additional left pigtail placed by IR for residual pneumothorax and air leak    Primary Surgeon/Attending MD: Dr. Good     Referring Physician: None   _ _ _ _ _ _ _ _ _ _ _ _  HOSPITAL COURSE:  52 year old Mandarin speaking male, former light smoker (smoked 2-3 cigarette/week), no significant medical history, who worked in [employer] for over 10 years presents with c/o persistent dry cough and SOB on exertion. Sputum AFB smear is negativex3, culture negative x 4 weeks. CT scan reveals YARITZA and LLL interstitial lung disease. 9/15/23 pt underwent L VATS YARITZA and LLL wedge resection with Dr. Good. OR course uneventful and recovered post op without difficulty. Pt reports feeling some new L sided CP last week with some associated crackling. At his routine f/u appointment 10/6/23, CXR revealed large PTX and patient was sent to ER for further management. Left sided pigtail placed. 10/7/23 lung remains up on AM Xray. CT placed to water seal. 10/8/23 increased pneumo on morning CXR, placed back to suction at -40 per Dr. Good.  Today, still has residual apical and lateral pneumothorax on morning Xray while tube remains on waterseal.  With this, patient is hemodynamically stable and denies SOB or pleuritic CP. 10/10/23 s/p IR apical pigtail placement and doxy pleurodesis.  10/11/23 CXR stable. CT x 2 to remain to suction at -40. Tachycardic- gentle IVF, lopressor 12.5mg BID- tachycardia improved.  Today 10/12/23, pt seen by Dr. Polo.  IR tube from 10/10/23 w/ small intermittent 1/5 air leak.  Tube from 10/9/23 w/o leak,  repeat doxy pleurodesis preformed with success. On 10/13 pt remained stable, am cxr no ptx and both chest tubes to suction with no leak. On 10/14 CT placed on waterseal at midnight, in the AM no airleak appreciated minimal out put and AM cxr with no ptx. Basilar ct removed first with cxr showing no interval change, apical CT removed and CXR showed stability. Pt was deemed medically stable for discharge by Dr. Vaca on 10/14. At time of discharge pt tolerated a PO diet, ambulated without assistance and had a post operative BM. Pt denies dizziness, vision changes, chest pain, palpitations, shortness of breath, cough, n/v/d, extremity swelling, calf tenderness. Plan for repeat CXR at follow up appointment.     _ _ _ _ _ _ _ _ _ _ _ _  DISCHARGE PHYSICAL EXAM:  GEN: NAD, looks comfortable  Psych: Mood appropriate  Neuro: A&Ox3.  No focal deficits.  Moving all extremities.   HEENT: No obvious abnormalities  CV: S1S2, regular, no murmurs appreciated.  No carotid bruits.  No JVD  Lungs: Clear B/L.  No wheezing, rales or rhonchi  ABD: Soft, non-tender, non-distended.    EXT: Warm and well perfused.  No peripheral edema noted  Musculoskeletal: Moving all extremities with normal ROM, no joint swelling  Incisions: Two pigtail sites are clean dry and intact without drainage or bleeding noted. occlusive dressings over both sites.   _ _ _ _ _ _ _ _ _ _ _ _  REMOVAL CHECKLIST:        [X] Stitches/tie downs,   If no, why? ______  _ _ _ _ _ _ _ _ _ _ _ _  RELEVANT LABS/IMAGING:  < from: Xray Chest 1 View-PORTABLE IMMEDIATE (Xray Chest 1 View-PORTABLE IMMEDIATE .) (10.14.23 @ 12:50) >      IMPRESSION: No interval change lung pathology    --- End of Report ---      < end of copied text >    _ _ _ _ _ _ _ _ _ _ _ _  CLINICAL FOLLOW UP NEEDS:     [X] Imaging            Type: CXR           When/Timing: follow up appointment            Outpatient team aware: YES   _ _ _ _ _ _ _ _ _ _ _  Over 35 minutes was spent with the patient reviewing the discharge material including medications, follow up appointments, recovery, concerning symptoms, and how to contact their health care providers if they have questions

## 2023-10-14 NOTE — DISCHARGE NOTE PROVIDER - NSDCMRMEDTOKEN_GEN_ALL_CORE_FT
acetaminophen 325 mg oral tablet: 2 tab(s) orally every 6 hours as needed for Temp greater or equal to 38C (100.4F), Mild Pain (1 - 3)  ibuprofen 400 mg oral tablet: 1 tab(s) orally every 6 hours as needed for  mild pain  Metoprolol Tartrate 25 mg oral tablet: 0.5 tab(s) orally every 12 hours  omeprazole 40 mg oral delayed release capsule: 1 orally once a day  oxyCODONE 5 mg oral tablet: 1 tab(s) orally every 8 hours as needed for Moderate Pain (4 - 6) MDD: 3  petrolatum topical ointment: 1 Apply topically to affected area 2 times a day  polyethylene glycol 3350 oral powder for reconstitution: 17 gram(s) orally once a day  predniSONE 20 mg oral tablet: 1 orally once a day  senna leaf extract oral tablet: 2 tab(s) orally once a day (at bedtime)  Trelegy Ellipta 200 mcg-62.5 mcg-25 mcg/inh inhalation powder: 1 inhaled once a day

## 2023-10-14 NOTE — PROGRESS NOTE ADULT - ASSESSMENT
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 Sputum AFB smear is negativex3, culture negative x 4 weeks. CT scan reveals YARITZA and LLL interstitial lung disease. 9/15/23 pt underwent L VATS YARITZA and LLL wedge resection with Dr. Good. OR course was uneventful and recovered post op without difficulty. 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 Pt reports feeling some new L sided CP last week with some associated crackling. At his routine f/u appointment 10/6/23, CXR revealed large PTX and patient was sent to ER for further management. Left sided pigtail placed. 10/7/23 lung remains up on AM Xray. CT placed to water seal. 10/8/23 increased pneumo on morning CXR, placed back to suction at -40 per Dr. Good.  Today, still has residual apical and lateral pneumothorax on morning Xray while tube remains on water seal, no air leak. IR consulted for left apical chest tube placement. and CTS plan on doxycycline pleurodesis as d/w pt, daughter and CTS team Dr. Polo this am.    - active care per CTS team, Dr Valiente   - IR consult appreciated  - s/p L apical chest tube placement by IR ( 10/10)   - s/p Doxycycline pleurodesis (10/10)  - s/p repeat Doxycylcine pleurodesis 11/12-   chest x ray reviewed.  no more air leak  plan for chest tube removal today , repeat x ray and then home if stable   - pain management     acetaminophen     Tablet .. 650 milliGRAM(s) Oral every 6 hours PRN Moderate Pain (4 - 6)  oxyCODONE    IR 5 milliGRAM(s) Oral every 8 hours PRN Moderate Pain (4 - 6)- helping -he request to have oxycodone prescription for discharge to take prn.    bowel regimen:   polyethylene glycol 3350 17 Gram(s) Oral daily  senna 2 Tablet(s) Oral at bedtime  - ILD: predniSONE   Tablet 10 milliGRAM(s) Oral daily ?? ( Pt was taking Prednisone 20mg daily at home)   - GI ppx: pantoprazole    Tablet 40 milliGRAM(s) Oral before breakfast  - DVT ppx: heparin   Injectable 5000 Unit(s) SubCutaneous every 8 hours  - IS at bedside     Contacts:  PMD: Dr. Jamarcus Gordon   Pulm: Dr. Teo Álvarez   Daughter: Lynn Gordon [phone]    POC as d/w CTS , RN  dw patient and daughter.   medically stable.

## 2023-10-14 NOTE — DISCHARGE NOTE PROVIDER - NSDCFUADDAPPT_GEN_ALL_CORE_FT
Regarding your follow up appointment, our office will call you with the scheduled dates and times, if you do not receive a phone call by 10/16 please call the office at [phone].

## 2023-10-14 NOTE — PROGRESS NOTE ADULT - PROVIDER SPECIALTY LIST ADULT
Hospitalist
Hospitalist
Thoracic Surgery
Thoracic Surgery
Hospitalist
Hospitalist
Thoracic Surgery

## 2023-10-14 NOTE — DISCHARGE NOTE PROVIDER - PROVIDER TOKENS
FREE:[LAST:[Florentino],FIRST:[Christ],PHONE:[phone]],FAX:[(   )    -],ADDRESS:[[address] [place] [address]],FOLLOWUP:[2 weeks]]

## 2023-10-14 NOTE — DISCHARGE NOTE NURSING/CASE MANAGEMENT/SOCIAL WORK - PATIENT PORTAL LINK FT
You can access the FollowMyHealth Patient Portal offered by [facility] by registering at the following website: [email or web address]. By joining [employer]’s FollowMyHealth portal, you will also be able to view your health information using other applications (apps) compatible with our system.

## 2023-10-14 NOTE — DISCHARGE NOTE PROVIDER - NSDCCPTREATMENT_GEN_ALL_CORE_FT
PRINCIPAL PROCEDURE  Procedure: Insertion, pigtail catheter, pleural  Findings and Treatment: X2     PRINCIPAL PROCEDURE  Procedure: Insertion, pigtail catheter, pleural  Findings and Treatment: X2  You have two dressings over your chest tube sites please remove the bandages in 2 days. (monday 10/16)

## 2023-10-24 PROBLEM — J95.811 POSTPROCEDURAL PNEUMOTHORAX: Status: ACTIVE | Noted: 2023-10-24

## 2023-10-26 DIAGNOSIS — Z98.890 OTHER SPECIFIED POSTPROCEDURAL STATES: ICD-10-CM
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
## 2023-10-26 DIAGNOSIS — J84.9 INTERSTITIAL PULMONARY DISEASE, UNSPECIFIED: ICD-10-CM
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
## 2023-10-26 DIAGNOSIS — J93.9 PNEUMOTHORAX, UNSPECIFIED: ICD-10-CM

## 2023-10-26 DIAGNOSIS — Z87.891 PERSONAL HISTORY OF NICOTINE DEPENDENCE: ICD-10-CM

## 2023-10-27 ENCOUNTER — APPOINTMENT (OUTPATIENT)
Dept: THORACIC SURGERY | Facility: CLINIC | Age: 53
End: 2023-10-27
Payer: COMMERCIAL

## 2023-10-27 ENCOUNTER — OUTPATIENT (OUTPATIENT)
Dept: OUTPATIENT SERVICES | Facility: HOSPITAL | Age: 53
LOS: 1 days | End: 2023-10-27
Payer: COMMERCIAL

## 2023-10-27 DIAGNOSIS — J95.811 POSTPROCEDURAL PNEUMOTHORAX: ICD-10-CM

## 2023-10-27 PROCEDURE — 71046 X-RAY EXAM CHEST 2 VIEWS: CPT

## 2023-10-27 PROCEDURE — 99024 POSTOP FOLLOW-UP VISIT: CPT

## 2023-10-27 PROCEDURE — 71046 X-RAY EXAM CHEST 2 VIEWS: CPT | Mod: 26

## 2023-10-31 RX ORDER — METOPROLOL TARTRATE 25 MG/1
25 TABLET, FILM COATED ORAL
Qty: 30 | Refills: 0 | Status: ACTIVE | COMMUNITY

## 2023-11-01 LAB
CULTURE RESULTS: SIGNIFICANT CHANGE UP
CULTURE RESULTS: SIGNIFICANT CHANGE UP
SPECIMEN SOURCE: SIGNIFICANT CHANGE UP
SPECIMEN SOURCE: SIGNIFICANT CHANGE UP

## 2023-11-03 ENCOUNTER — APPOINTMENT (OUTPATIENT)
Dept: THORACIC SURGERY | Facility: CLINIC | Age: 53
End: 2023-11-03
Payer: COMMERCIAL

## 2023-11-03 ENCOUNTER — OUTPATIENT (OUTPATIENT)
Dept: OUTPATIENT SERVICES | Facility: HOSPITAL | Age: 53
LOS: 1 days | End: 2023-11-03
Payer: COMMERCIAL

## 2023-11-03 VITALS
HEART RATE: 118 BPM | DIASTOLIC BLOOD PRESSURE: 70 MMHG | SYSTOLIC BLOOD PRESSURE: 110 MMHG | BODY MASS INDEX: 18.77 KG/M2 | HEIGHT: 67.5 IN | OXYGEN SATURATION: 95 % | WEIGHT: 121 LBS | RESPIRATION RATE: 16 BRPM | TEMPERATURE: 97.4 F

## 2023-11-03 DIAGNOSIS — Z09 ENCOUNTER FOR FOLLOW-UP EXAMINATION AFTER COMPLETED TREATMENT FOR CONDITIONS OTHER THAN MALIGNANT NEOPLASM: ICD-10-CM

## 2023-11-03 DIAGNOSIS — R00.0 TACHYCARDIA, UNSPECIFIED: ICD-10-CM

## 2023-11-03 DIAGNOSIS — J98.4 OTHER DISORDERS OF LUNG: ICD-10-CM

## 2023-11-03 PROCEDURE — 99024 POSTOP FOLLOW-UP VISIT: CPT

## 2023-11-03 PROCEDURE — 71046 X-RAY EXAM CHEST 2 VIEWS: CPT | Mod: 26

## 2023-11-03 PROCEDURE — 71046 X-RAY EXAM CHEST 2 VIEWS: CPT

## 2024-01-17 NOTE — HISTORY OF PRESENT ILLNESS
[FreeTextEntry1] : 53-year-old male, former light smoker, without significant medical history. 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 Pt was referred by Dr. Teo Álvarez for interstitial lung disease YARITZA and LLL.  On 09/15/23, the patient underwent Left video-assisted thoracoscopic surgery, robotic assisted, Wedge resection of a portion of left lower lobe, anterior basal segment of the lung, Wedge resection of portion of left upper lobe near the major fissure. Surgical pathology showed end-stage honeycomb lung. Cultures for fungus, AFB, and gram stain all negative.   He developed a large L PTX 2weeks postoperatively. Hospitalized in [facility] from 10/06 to 10/14/2023, s/p R apical pigtail placement and doxy pleurodesis on 10/10/2023.  On 11/03/2023 visit, patient was tachycardiac, with CXR indicating interval decrease of left apical pneumothorax. He was suggested to see pulmonologist Dr. Teo Álvarez for chronic lung disease management, continue metoprolol and get a cardiology evaluation.   He presents today for a follow up with CXR.

## 2024-01-19 ENCOUNTER — NON-APPOINTMENT (OUTPATIENT)
Age: 54
End: 2024-01-19

## 2024-02-02 ENCOUNTER — APPOINTMENT (OUTPATIENT)
Dept: THORACIC SURGERY | Facility: CLINIC | Age: 54
End: 2024-02-02

## 2024-09-26 NOTE — ED PROVIDER NOTE - CARE PLAN
Problem: Adult Inpatient Plan of Care  Goal: Plan of Care Review  Outcome: Progressing  Goal: Patient-Specific Goal (Individualized)  Outcome: Progressing  Goal: Absence of Hospital-Acquired Illness or Injury  Outcome: Progressing  Goal: Optimal Comfort and Wellbeing  Outcome: Progressing  Goal: Readiness for Transition of Care  Outcome: Progressing     Problem: Diabetes Comorbidity  Goal: Blood Glucose Level Within Targeted Range  Outcome: Progressing     Problem: Skin Injury Risk Increased  Goal: Skin Health and Integrity  Outcome: Progressing     Problem: Fall Injury Risk  Goal: Absence of Fall and Fall-Related Injury  Outcome: Progressing  Intervention: Identify and Manage Contributors  Flowsheets (Taken 9/26/2024 0614)  Self-Care Promotion:   BADL personal objects within reach   BADL personal routines maintained  Medication Review/Management:   medications reviewed   dosing adjusted  Intervention: Promote Injury-Free Environment  Flowsheets (Taken 9/26/2024 0614)  Safety Promotion/Fall Prevention:   assistive device/personal item within reach   Fall Risk reviewed with patient/family   instructed to call staff for mobility   family to remain at bedside     Problem: Wound  Goal: Optimal Coping  Outcome: Progressing  Goal: Optimal Functional Ability  Outcome: Progressing  Goal: Absence of Infection Signs and Symptoms  Outcome: Progressing  Goal: Improved Oral Intake  Outcome: Progressing  Goal: Optimal Pain Control and Function  Outcome: Progressing  Goal: Skin Health and Integrity  Outcome: Progressing  Goal: Optimal Wound Healing  Outcome: Progressing      Principal Discharge DX:	Pneumothorax   1

## (undated) DEVICE — GLV 7.5 PROTEXIS (WHITE)

## (undated) DEVICE — ENDOCATCH GENERAL 10MM (PURPLE)

## (undated) DEVICE — ELCTR GROUNDING PAD ADULT COVIDIEN

## (undated) DEVICE — DVC ASCOPE 4 SNGL USE SLIM

## (undated) DEVICE — ELCTR BOVIE TIP BLADE INSULATED 6.5" EDGE

## (undated) DEVICE — WARMING BLANKET LOWER ADULT

## (undated) DEVICE — ELCTR BOVIE TIP SPATULA MEGADYNE E-Z CLEAN LAPAROSCOPIC 13.5" STANDARD

## (undated) DEVICE — CHEST DRAIN PLEUR-EVAC DRY/WET ADULT-PEDS SINGLE (QUICK)

## (undated) DEVICE — DRSG GAUZE PACKTNER ROLL

## (undated) DEVICE — ELCTR BOVIE TIP BLADE VALLEYLAB 6.5"

## (undated) DEVICE — STAPLER COVIDIEN ENDO GIA STANDARD HANDLE

## (undated) DEVICE — D HELP - CLEARVIEW CLEARIFY SYSTEM

## (undated) DEVICE — XI SEAL UNIV 5- 8 MM

## (undated) DEVICE — XI DRAPE COLUMN

## (undated) DEVICE — VENODYNE/SCD SLEEVE CALF MEDIUM

## (undated) DEVICE — XI DRAPE ARM

## (undated) DEVICE — ENDOCATCH GENERAL 15MM (PURPLE)

## (undated) DEVICE — TAPE SILK 3"

## (undated) DEVICE — PACK ROBOTIC

## (undated) DEVICE — TROCAR ETHICON ENDOPATH XCEL BLADELESS 12MM X 100MM SMOOTH

## (undated) DEVICE — Device

## (undated) DEVICE — XI VESSEL SEALER

## (undated) DEVICE — XI OBTURATOR OPTICAL BLADELESS 8MM

## (undated) DEVICE — TUBING STRYKER PNEUMOCLEAR HEAT HUMID

## (undated) DEVICE — SUT PROLENE 0 30" CT-1

## (undated) DEVICE — TROCAR ETHICON ENDOPATH XCEL BLADELESS 15MM X 100MM STABILITY

## (undated) DEVICE — XI ARM FORCEP FENESTRATED BIPOLAR 8MM

## (undated) DEVICE — TROCAR ETHICON ENDOPATH XCEL BLADELESS 5MM X 100MM STABILITY